# Patient Record
Sex: MALE | Race: WHITE | Employment: OTHER | URBAN - NONMETROPOLITAN AREA
[De-identification: names, ages, dates, MRNs, and addresses within clinical notes are randomized per-mention and may not be internally consistent; named-entity substitution may affect disease eponyms.]

---

## 2018-03-10 ENCOUNTER — HOSPITAL ENCOUNTER (EMERGENCY)
Facility: HOSPITAL | Age: 63
Discharge: ANOTHER HOSPITAL | End: 2018-03-10
Attending: FAMILY MEDICINE | Admitting: FAMILY MEDICINE
Payer: MEDICARE

## 2018-03-10 ENCOUNTER — APPOINTMENT (OUTPATIENT)
Dept: CT IMAGING | Facility: HOSPITAL | Age: 63
End: 2018-03-10
Attending: FAMILY MEDICINE
Payer: MEDICARE

## 2018-03-10 ENCOUNTER — TRANSFERRED RECORDS (OUTPATIENT)
Dept: HEALTH INFORMATION MANAGEMENT | Facility: CLINIC | Age: 63
End: 2018-03-10

## 2018-03-10 VITALS
WEIGHT: 205 LBS | SYSTOLIC BLOOD PRESSURE: 215 MMHG | HEART RATE: 85 BPM | RESPIRATION RATE: 18 BRPM | TEMPERATURE: 98.6 F | HEIGHT: 72 IN | OXYGEN SATURATION: 98 % | DIASTOLIC BLOOD PRESSURE: 164 MMHG | BODY MASS INDEX: 27.77 KG/M2

## 2018-03-10 DIAGNOSIS — R47.01 EXPRESSIVE APHASIA: ICD-10-CM

## 2018-03-10 DIAGNOSIS — I63.9 CEREBROVASCULAR ACCIDENT (CVA), UNSPECIFIED MECHANISM (H): ICD-10-CM

## 2018-03-10 LAB
ANION GAP SERPL CALCULATED.3IONS-SCNC: 13 MMOL/L (ref 3–14)
APTT PPP: 27 SEC (ref 24–37)
BASOPHILS # BLD AUTO: 0.1 10E9/L (ref 0–0.2)
BASOPHILS NFR BLD AUTO: 0.9 %
BUN SERPL-MCNC: 62 MG/DL (ref 7–30)
CALCIUM SERPL-MCNC: 8.7 MG/DL (ref 8.5–10.1)
CHLORIDE SERPL-SCNC: 106 MMOL/L (ref 94–109)
CO2 SERPL-SCNC: 16 MMOL/L (ref 20–32)
CREAT SERPL-MCNC: 6.89 MG/DL (ref 0.66–1.25)
DIFFERENTIAL METHOD BLD: ABNORMAL
EOSINOPHIL # BLD AUTO: 0.4 10E9/L (ref 0–0.7)
EOSINOPHIL NFR BLD AUTO: 3.9 %
ERYTHROCYTE [DISTWIDTH] IN BLOOD BY AUTOMATED COUNT: 13.2 % (ref 10–15)
GFR SERPL CREATININE-BSD FRML MDRD: 8 ML/MIN/1.7M2
GLUCOSE SERPL-MCNC: 103 MG/DL (ref 70–99)
HCT VFR BLD AUTO: 35.9 % (ref 40–53)
HGB BLD-MCNC: 12.2 G/DL (ref 13.3–17.7)
IMM GRANULOCYTES # BLD: 0.1 10E9/L (ref 0–0.4)
IMM GRANULOCYTES NFR BLD: 0.6 %
INR PPP: 0.89 (ref 0.8–1.2)
LYMPHOCYTES # BLD AUTO: 1.6 10E9/L (ref 0.8–5.3)
LYMPHOCYTES NFR BLD AUTO: 15.1 %
MCH RBC QN AUTO: 33.3 PG (ref 26.5–33)
MCHC RBC AUTO-ENTMCNC: 34 G/DL (ref 31.5–36.5)
MCV RBC AUTO: 98 FL (ref 78–100)
MONOCYTES # BLD AUTO: 0.4 10E9/L (ref 0–1.3)
MONOCYTES NFR BLD AUTO: 4.3 %
NEUTROPHILS # BLD AUTO: 7.8 10E9/L (ref 1.6–8.3)
NEUTROPHILS NFR BLD AUTO: 75.2 %
NRBC # BLD AUTO: 0 10*3/UL
NRBC BLD AUTO-RTO: 0 /100
PLATELET # BLD AUTO: 169 10E9/L (ref 150–450)
POTASSIUM SERPL-SCNC: 4.5 MMOL/L (ref 3.4–5.3)
RBC # BLD AUTO: 3.66 10E12/L (ref 4.4–5.9)
SODIUM SERPL-SCNC: 135 MMOL/L (ref 133–144)
TROPONIN I SERPL-MCNC: 0.62 UG/L (ref 0–0.04)
WBC # BLD AUTO: 10.3 10E9/L (ref 4–11)

## 2018-03-10 PROCEDURE — 25000128 H RX IP 250 OP 636: Performed by: RADIOLOGY

## 2018-03-10 PROCEDURE — A9270 NON-COVERED ITEM OR SERVICE: HCPCS | Mod: GY | Performed by: FAMILY MEDICINE

## 2018-03-10 PROCEDURE — 85025 COMPLETE CBC W/AUTO DIFF WBC: CPT | Performed by: FAMILY MEDICINE

## 2018-03-10 PROCEDURE — 70498 CT ANGIOGRAPHY NECK: CPT | Mod: TC

## 2018-03-10 PROCEDURE — 96375 TX/PRO/DX INJ NEW DRUG ADDON: CPT

## 2018-03-10 PROCEDURE — 25000128 H RX IP 250 OP 636

## 2018-03-10 PROCEDURE — 96365 THER/PROPH/DIAG IV INF INIT: CPT

## 2018-03-10 PROCEDURE — 85610 PROTHROMBIN TIME: CPT | Performed by: FAMILY MEDICINE

## 2018-03-10 PROCEDURE — 99291 CRITICAL CARE FIRST HOUR: CPT | Mod: 25

## 2018-03-10 PROCEDURE — 84484 ASSAY OF TROPONIN QUANT: CPT | Performed by: FAMILY MEDICINE

## 2018-03-10 PROCEDURE — 70450 CT HEAD/BRAIN W/O DYE: CPT | Mod: TC

## 2018-03-10 PROCEDURE — 80048 BASIC METABOLIC PNL TOTAL CA: CPT | Performed by: FAMILY MEDICINE

## 2018-03-10 PROCEDURE — 25000128 H RX IP 250 OP 636: Performed by: FAMILY MEDICINE

## 2018-03-10 PROCEDURE — 99285 EMERGENCY DEPT VISIT HI MDM: CPT | Performed by: FAMILY MEDICINE

## 2018-03-10 PROCEDURE — 25000132 ZZH RX MED GY IP 250 OP 250 PS 637: Mod: GY | Performed by: FAMILY MEDICINE

## 2018-03-10 PROCEDURE — 00000146 ZZHCL STATISTIC GLUCOSE BY METER IP

## 2018-03-10 PROCEDURE — 36415 COLL VENOUS BLD VENIPUNCTURE: CPT | Mod: 59 | Performed by: FAMILY MEDICINE

## 2018-03-10 PROCEDURE — 96376 TX/PRO/DX INJ SAME DRUG ADON: CPT

## 2018-03-10 PROCEDURE — 85730 THROMBOPLASTIN TIME PARTIAL: CPT | Performed by: FAMILY MEDICINE

## 2018-03-10 PROCEDURE — 93010 ELECTROCARDIOGRAM REPORT: CPT | Performed by: INTERNAL MEDICINE

## 2018-03-10 PROCEDURE — 93005 ELECTROCARDIOGRAM TRACING: CPT

## 2018-03-10 RX ORDER — CLONIDINE HYDROCHLORIDE 0.1 MG/1
0.1 TABLET ORAL ONCE
Status: COMPLETED | OUTPATIENT
Start: 2018-03-10 | End: 2018-03-10

## 2018-03-10 RX ORDER — ISOSORBIDE MONONITRATE 30 MG/1
30 TABLET, EXTENDED RELEASE ORAL ONCE
Status: COMPLETED | OUTPATIENT
Start: 2018-03-10 | End: 2018-03-10

## 2018-03-10 RX ORDER — CARVEDILOL 12.5 MG/1
12.5 TABLET ORAL ONCE
Status: COMPLETED | OUTPATIENT
Start: 2018-03-10 | End: 2018-03-10

## 2018-03-10 RX ORDER — LABETALOL HYDROCHLORIDE 5 MG/ML
10 INJECTION, SOLUTION INTRAVENOUS EVERY 10 MIN PRN
Status: DISCONTINUED | OUTPATIENT
Start: 2018-03-10 | End: 2018-03-10 | Stop reason: HOSPADM

## 2018-03-10 RX ORDER — IOPAMIDOL 755 MG/ML
75 INJECTION, SOLUTION INTRAVASCULAR ONCE
Status: COMPLETED | OUTPATIENT
Start: 2018-03-10 | End: 2018-03-10

## 2018-03-10 RX ADMIN — LABETALOL HYDROCHLORIDE 20 MG: 5 INJECTION, SOLUTION INTRAVENOUS at 12:39

## 2018-03-10 RX ADMIN — LABETALOL HYDROCHLORIDE 10 MG: 5 INJECTION, SOLUTION INTRAVENOUS at 12:26

## 2018-03-10 RX ADMIN — ALTEPLASE 75.33 MG: KIT at 12:08

## 2018-03-10 RX ADMIN — ISOSORBIDE MONONITRATE 30 MG: 30 TABLET, EXTENDED RELEASE ORAL at 12:52

## 2018-03-10 RX ADMIN — LABETALOL HYDROCHLORIDE 10 MG: 5 INJECTION, SOLUTION INTRAVENOUS at 12:18

## 2018-03-10 RX ADMIN — ALTEPLASE 8.37 MG: KIT at 12:03

## 2018-03-10 RX ADMIN — CLONIDINE HYDROCHLORIDE 0.1 MG: 0.1 TABLET ORAL at 12:44

## 2018-03-10 RX ADMIN — CARVEDILOL 12.5 MG: 12.5 TABLET, FILM COATED ORAL at 12:52

## 2018-03-10 RX ADMIN — IOPAMIDOL 75 ML: 755 INJECTION, SOLUTION INTRAVENOUS at 11:44

## 2018-03-10 RX ADMIN — SODIUM CHLORIDE 1000 ML: 9 INJECTION, SOLUTION INTRAVENOUS at 11:37

## 2018-03-10 RX ADMIN — LABETALOL HYDROCHLORIDE 10 MG: 5 INJECTION, SOLUTION INTRAVENOUS at 12:06

## 2018-03-10 NOTE — ED NOTES
Code stroked called overhead at 1103 after report from EMS. Per EMS pt was shoveling snow and felt like he might be having a stroke. EMS notes slight right sided facial droop, slurred speech and confusion. VORB per Dr. Mccauley to order head CT. Pt directly to CT with MD and primary RN.

## 2018-03-10 NOTE — ED PROVIDER NOTES
History     Chief Complaint   Patient presents with     Cerebrovascular Accident     right side facial droop, slurred speech, confusion, Code stroke called in field by EMS     HPI  Stuart Stewart is a 62 year old male who was outside shoveling snow and began to have problems with right-sided facial droop, garbled speech, confusion, and right hand weakness.  Apparently he also had some leg weakness initially because he was stumbling around.  He managed to get himself in the house and call 911.  Code stroke was called in the field by EMS.  Patient was taken directly to CT on arrival in the hospital.  On arrival here he had garbled speech with occasional yes and no answers were oriented, however he could not get out any other intelligible speech.  He has right hand weakness, was having difficulty writing things for us.  He got very frustrated and was unable to communicate as well as he wanted to initially, but eventually was he did communicate to us that he is concerned about draining his dialysate.  We do not have the equipment for this and that is deferred to [facility].  I spoke with his dialysis coordinator and she stated that it was fine to leave the fluid in, but it would eventually reabsorb, but the patient was not interested in that.  He also told us that he had not taken any of his pills yet this morning.    Problem List:    There are no active problems to display for this patient.     Past Medical History:    No past medical history on file.    Past Surgical History:    No past surgical history on file.    Family History:    No family history on file.    Social History:  Marital Status:   [4]  Social History   Substance Use Topics     Smoking status: Current Every Day Smoker     Packs/day: 2.00     Smokeless tobacco: Never Used     Alcohol use No        Medications:      Carvedilol (COREG PO)   CloNIDine HCl (CATAPRES PO)   Isosorbide Mononitrate (IMDUR PO)   FINASTERIDE PO   Lisinopril (PRINIVIL  PO)   senna-docusate (SENOKOT-S;PERICOLACE) 8.6-50 MG per tablet   Sertraline HCl (ZOLOFT PO)   Simvastatin (ZOCOR PO)   tamsulosin (FLOMAX) 0.4 MG 24 hr capsule   B Complex-C-Folic Acid (DIALYVITE PO)   MINOXIDIL PO   UNABLE TO FIND         Review of Systems   Unable to perform ROS: Patient nonverbal       Physical Exam   BP: (!) 203/136  Pulse: 73  Heart Rate: 74  Temp: 98.6  F (37  C)  Resp: 18  Height: 182.9 cm (6')  Weight: 93 kg (205 lb)  SpO2: 99 %      Physical Exam   Constitutional: He appears well-developed and well-nourished. He appears distressed.   HENT:   Head: Normocephalic and atraumatic.   Did not fall or hit head.   Neck: Normal range of motion. Neck supple.   Cardiovascular: Normal rate, regular rhythm, normal heart sounds and intact distal pulses.    No murmur heard.  Pulmonary/Chest: Effort normal and breath sounds normal. No respiratory distress.   Abdominal: Soft. Bowel sounds are normal. He exhibits no distension. There is no tenderness.   Dialysis catheter present on mid abdomen.   Musculoskeletal: Normal range of motion. He exhibits no edema.   Neurological: He is alert. No cranial nerve deficit. He exhibits normal muscle tone. Coordination abnormal.   Right hand weakness / incoordination.  Speech garbled, unintelligible at time of arrival.   Skin: Skin is warm and dry.   Psychiatric: His affect is labile.   Pseudobulbar affect.   Nursing note and vitals reviewed.      ED Course     ED Course     Procedures          EKG Interpretation:      Interpreted by Jeanine Flores  Time reviewed: 1140  Symptoms at time of EKG: stroke   Rhythm: 1 degree AV block  Rate: Normal  Axis: Normal  Ectopy: none  Conduction: normal  ST Segments/ T Waves: Non-specific T wave changes  Q Waves: I, II, III and shallow  Comparison to prior: No old EKG available    Clinical Impression: non-specific EKG      Results for orders placed or performed during the hospital encounter of 03/10/18 (from the past 24  hour(s))   Basic metabolic panel   Result Value Ref [place]    Sodium 135 133 - 144 mmol/L    Potassium 4.5 3.4 - 5.3 mmol/L    Chloride 106 94 - 109 mmol/L    Carbon Dioxide 16 (L) 20 - 32 mmol/L    Anion Gap 13 3 - 14 mmol/L    Glucose 103 (H) 70 - 99 mg/dL    Urea Nitrogen 62 (H) 7 - 30 mg/dL    Creatinine 6.89 (H) 0.66 - 1.25 mg/dL    GFR Estimate 8 (L) >60 mL/min/1.7m2    GFR Estimate If Black 10 (L) >60 mL/min/1.7m2    Calcium 8.7 8.5 - 10.1 mg/dL   CBC with platelets differential   Result Value Ref [place]    WBC 10.3 4.0 - 11.0 10e9/L    RBC Count 3.66 (L) 4.4 - 5.9 10e12/L    Hemoglobin 12.2 (L) 13.3 - 17.7 g/dL    Hematocrit 35.9 (L) 40.0 - 53.0 %    MCV 98 78 - 100 fl    MCH 33.3 (H) 26.5 - 33.0 pg    MCHC 34.0 31.5 - 36.5 g/dL    RDW 13.2 10.0 - 15.0 %    Platelet Count 169 150 - 450 10e9/L    Diff Method Automated Method     % Neutrophils 75.2 %    % Lymphocytes 15.1 %    % Monocytes 4.3 %    % Eosinophils 3.9 %    % Basophils 0.9 %    % Immature Granulocytes 0.6 %    Nucleated RBCs 0 0 /100    Absolute Neutrophil 7.8 1.6 - 8.3 10e9/L    Absolute Lymphocytes 1.6 0.8 - 5.3 10e9/L    Absolute Monocytes 0.4 0.0 - 1.3 10e9/L    Absolute Eosinophils 0.4 0.0 - 0.7 10e9/L    Absolute Basophils 0.1 0.0 - 0.2 10e9/L    Abs Immature Granulocytes 0.1 0 - 0.4 10e9/L    Absolute Nucleated RBC 0.0    INR   Result Value Ref [place]    INR 0.89 0.80 - 1.20   Partial thromboplastin time   Result Value Ref [place]    PTT 27 24.00 - 37.00 sec   Troponin I   Result Value Ref [place]    Troponin I ES 0.619 (HH) 0.000 - 0.045 ug/L   Head CT w/o contrast    Narrative    PROCEDURE: CT HEAD W/O CONTRAST   3/10/2018 11:38 AM    HISTORY:Male, age,  62 years, , , code stroke;     COMPARISON: None    TECHNIQUE: CT of the brain without contrast.    FINDINGS: Ventricles and sulci are mildly prominent in size and shape.  Gray and white matter demonstrate scattered areas of decreased  density. Encephalomalacia suggesting previous watershed  infarct in the  right parietal region prior infarctions involving portions of the left  and right posterior cerebral artery distributions.    There is no evidence of mass, mass effect or midline shift. No  evidence of acute hemorrhage.    The bones are essentially normal. No fracture.       Impression    IMPRESSION:   1.  No acute intracranial hemorrhage. No acute fracture.     2.  White matter changes suggesting small vessel disease with old  right parietal watershed infarct and old infarcts involving a portion  of the left and right posterior cerebral artery distributions.    ASHLEY MCKENNA MD   CTA Angiogram Head Neck    Narrative    PROCEDURE: CTA ANGIOGRAM HEAD NECK   3/10/2018 11:47 AM    HISTORY:Male, age,  62 years, , , stroke sx;     COMPARISON: Head CT 3/10/2018    TECHNIQUE: CT scan was performed of the head and neck  before and  after the administration of intravenous contrast. Sagittal, coronal,  axial and 3-D reconstructed images were reviewed.    FINDINGS:     Neck Angiogram:  Scattered atherosclerotic calcifications throughout  the aortic arch. Tortuous, ectatic origin of both the left and right  subclavian arteries, with prominent atherosclerotic plaque.  Moderate volume of calcified plaque is seen within the carotid  bifurcations.    Head angiogram: The vertebral basilar system is patent..  The left and right internal carotid arteries demonstrate mild  atherosclerotic disease in the cavernous portions. The anterior and  middle cerebral arteries and respective branches are also grossly  normal.    CT scan neck: Salivary glands are normal. Oral and pharyngeal mucosa  is also normal. Larynx is normal. No lymphadenopathy. Paraspinous  muscles are normal. Severe facet joint degenerative changes are seen  at C3-4 with resulting grade 1-2 subluxation of C3 relative to C4.  Uncinate spurring contributes to moderately severe bilateral foraminal  stenosis at C4-5, C5-6 and C6-7. There are prominent  hypertrophic  changes seen about the left facet joint at C4-5.    Head CT: There is no evidence of mass, mass effect or midline shift.  No evidence of acute hemorrhage.    Mandible is intact. Paranasal sinuses are normal. No evidence acute  fracture.       Impression    IMPRESSION:   1.  No evidence of acute abnormality.    2.  Mild stenosis involving the origin of the right ICA secondary to  calcified noncalcified plaque.    3.  Mixed paraseptal and centrilobular emphysema within the lung  apices.    4.  Prominent degenerative changes throughout the cervical spine with  facet related subluxation of C3 relative to C4, bilateral foraminal  stenosis at C3-4 through C6-7.     5.  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    ASHLEY LEÓN MD          CTA Angiogram Head Neck (Final result) Result time: 03/10/18 12:20:33     Final result by Ashley León MD (03/10/18 12:20:33)     Impression:     IMPRESSION:   1.  No evidence of acute abnormality.    2.  Mild stenosis involving the origin of the right ICA secondary to  calcified noncalcified plaque.    3.  Mixed paraseptal and centrilobular emphysema within the lung  apices.    4.  Prominent degenerative changes throughout the cervical spine with  facet related subluxation of C3 relative to C4, bilateral foraminal  stenosis at C3-4 through C6-7.     5.  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    ASHLEY LEÓN MD     Narrative:     PROCEDURE: CTA ANGIOGRAM HEAD NECK   3/10/2018 11:47 AM    HISTORY:Male, age,  62 years, , , stroke sx;     COMPARISON: Head CT 3/10/2018    TECHNIQUE: CT scan was performed of the head and neck  before and  after the administration of intravenous contrast. Sagittal, coronal,  axial and 3-D reconstructed images were reviewed.    FINDINGS:     Neck Angiogram:  Scattered atherosclerotic calcifications throughout  the aortic arch. Tortuous, ectatic origin of both the  left and right  subclavian arteries, with prominent atherosclerotic plaque.  Moderate volume of calcified plaque is seen within the carotid  bifurcations.    Head angiogram: The vertebral basilar system is patent..  The left and right internal carotid arteries demonstrate mild  atherosclerotic disease in the cavernous portions. The anterior and  middle cerebral arteries and respective branches are also grossly  normal.    CT scan neck: Salivary glands are normal. Oral and pharyngeal mucosa  is also normal. Larynx is normal. No lymphadenopathy. Paraspinous  muscles are normal. Severe facet joint degenerative changes are seen  at C3-4 with resulting grade 1-2 subluxation of C3 relative to C4.  Uncinate spurring contributes to moderately severe bilateral foraminal  stenosis at C4-5, C5-6 and C6-7. There are prominent hypertrophic  changes seen about the left facet joint at C4-5.    Head CT: There is no evidence of mass, mass effect or midline shift.  No evidence of acute hemorrhage.    Mandible is intact. Paranasal sinuses are normal. No evidence acute  fracture.        Assessments & Plan (with Medical Decision Making)   Patient is having stroke.  CT angiogram notes fusiform ectasia including the proximal aspect of the basilar artery and also mild stenosis of the origin of the right ICA secondary to calcified plaque.  No evidence of hemorrhage.  Patient meets criteria for TPA the exception of his blood pressure which is decreasing medication.  TPA administered and patient's speech improved significantly.  Still has some weakness in his right hand, is not able to hold a pen to write.  See HPI regarding the concerns with his peritoneal dialysis catheter.  Patient is being transported to [facility] via helicopter for consideration of thrombectomy.    I have reviewed the nursing notes.    I have reviewed the findings, diagnosis, plan and need for follow up with the patient.  New Prescriptions    No medications on file        Final diagnoses:   Cerebrovascular accident (CVA), unspecified mechanism (H)   Expressive aphasia       3/10/2018   [facility]     Jeanine Bishop MD  03/10/18 9242

## 2018-03-10 NOTE — DISCHARGE INSTRUCTIONS
What to expect when you have contrast    During your exam, we will inject  contrast  into your vein or artery. (Contrast is a clear liquid with iodine in it. It shows up on X-rays.)    You may feel warm or hot. You may have a metal taste in your mouth and a slight upset stomach. You may also feel pressure near the kidneys and bladder. These effects will last about 1 to 3 minutes.    Please tell us if you have:    Sneezing     Itching    Hives     Swelling in the face    A hoarse voice    Breathing problems    Other new symptoms    Serious problems are rare.  They may include:    Irregular heartbeat     Seizures    Kidney failure              Tissue damage    Shock      Death    If you have any problems during the exam, we  will treat them right away.    When you get home    Call your hospital if you have any new symptoms in the next 2 days, like hives or swelling. (Phone numbers are at the bottom of this page.) Or call your family doctor.     If you have wheezing or trouble breathing, call 911.    Self-care  -Drink at least 4 extra glasses of water today.   This reduces the stress on your kidneys.  -Keep taking your regular medicines.    The contrast will pass out of your body in your  Urine(pee). This will happen in the next 24 hours. You  will not feel this. Your urine will not  change color.    If you have kidney problems or take metformin    Drink 4 to 8 large glasses of water for the next  2 days, if you are not on a fluid restriction.    ?If you take metformin (Glucophage or Glucovance) for diabetes, keep taking it.      ?Your kidney function tests are abnormal.  If you take Metformin, do not take it for 48 hours. Please go to your clinic for a blood test within 3 days after your exam before the restarting this medicine.     (Note to provider:please give patient prescription for lab tests.)    ?Special instructions: Drink 4 to 8 large glasses of water for the next  2 days, if you are not on a fluid  restriction.      I have read and understand the above information.    Patient Sign Here:______________________________________Date:________Time:______    Staff Sign Here:________________________________________Date:_______Time:______      Radiology Departments:     ?[facility]: [phone] ?[place]: [phone]     ?[place]: [phone] ?[facility]:[phone]      ?[place]: [phone]  ?[place]: [phone]  ?[place]:[phone]    ?[facility]:[phone]  ?[facility]:[phone]

## 2018-03-10 NOTE — ED NOTES
Critical result of  troponin value of 0.619  Reported to  Dr. Tariq Flores  Time given to provider  1230  Dacia Sandhu RN 12:30 PM 3/10/2018

## 2018-03-10 NOTE — ED NOTES
Patient being transported to [facility] with [employer]. TPA and fluids infusing on transfer. tPA administration copied and sent with flight crew. Belongings sent with patient. Dr. Mike Flores contacted patient's brother Joe and left voicemail to call this facility. Elvia with flight crew also called patient's brother Jarrell to update. Improvement in patient's speech noted on patient's departure. See documentation sheet for vital signs.

## 2018-03-13 LAB — GLUCOSE BLDC GLUCOMTR-MCNC: 105 MG/DL (ref 70–99)

## 2018-08-07 ENCOUNTER — HOSPITAL ENCOUNTER (OUTPATIENT)
Dept: CT IMAGING | Facility: HOSPITAL | Age: 63
Discharge: HOME | End: 2018-08-07
Attending: FAMILY MEDICINE | Admitting: FAMILY MEDICINE
Payer: MEDICARE

## 2018-08-07 DIAGNOSIS — R11.0 NAUSEA: ICD-10-CM

## 2018-08-07 DIAGNOSIS — R63.4 WEIGHT LOSS: ICD-10-CM

## 2018-08-07 DIAGNOSIS — R10.9 ABDOMINAL PAIN: ICD-10-CM

## 2018-08-07 PROCEDURE — 74177 CT ABD & PELVIS W/CONTRAST: CPT | Mod: TC

## 2018-08-07 PROCEDURE — 25000128 H RX IP 250 OP 636: Performed by: RADIOLOGY

## 2018-08-07 RX ORDER — IOPAMIDOL 612 MG/ML
100 INJECTION, SOLUTION INTRAVASCULAR ONCE
Status: COMPLETED | OUTPATIENT
Start: 2018-08-07 | End: 2018-08-07

## 2018-08-07 RX ADMIN — DIATRIZOATE MEGLUMINE AND DIATRIZOATE SODIUM 30 ML: 660; 100 SOLUTION ORAL; RECTAL at 08:22

## 2018-08-07 RX ADMIN — IOPAMIDOL 100 ML: 612 INJECTION, SOLUTION INTRAVENOUS at 08:23

## 2020-01-01 ENCOUNTER — APPOINTMENT (OUTPATIENT)
Dept: PHYSICAL THERAPY | Facility: HOSPITAL | Age: 65
End: 2020-01-01
Attending: INTERNAL MEDICINE
Payer: MEDICARE

## 2020-01-01 ENCOUNTER — APPOINTMENT (OUTPATIENT)
Dept: CT IMAGING | Facility: HOSPITAL | Age: 65
End: 2020-01-01
Attending: EMERGENCY MEDICINE
Payer: MEDICARE

## 2020-01-01 ENCOUNTER — APPOINTMENT (OUTPATIENT)
Dept: GENERAL RADIOLOGY | Facility: HOSPITAL | Age: 65
End: 2020-01-01
Attending: FAMILY MEDICINE
Payer: MEDICARE

## 2020-01-01 ENCOUNTER — APPOINTMENT (OUTPATIENT)
Dept: OCCUPATIONAL THERAPY | Facility: HOSPITAL | Age: 65
End: 2020-01-01
Payer: MEDICARE

## 2020-01-01 ENCOUNTER — APPOINTMENT (OUTPATIENT)
Dept: PHYSICAL THERAPY | Facility: HOSPITAL | Age: 65
End: 2020-01-01
Payer: MEDICARE

## 2020-01-01 ENCOUNTER — HOSPITAL ENCOUNTER (EMERGENCY)
Facility: HOSPITAL | Age: 65
Discharge: HOME | End: 2020-01-23
Attending: EMERGENCY MEDICINE | Admitting: EMERGENCY MEDICINE
Payer: MEDICARE

## 2020-01-01 ENCOUNTER — APPOINTMENT (OUTPATIENT)
Dept: OCCUPATIONAL THERAPY | Facility: HOSPITAL | Age: 65
End: 2020-01-01
Attending: INTERNAL MEDICINE
Payer: MEDICARE

## 2020-01-01 ENCOUNTER — HOSPITAL ENCOUNTER (OUTPATIENT)
Facility: HOSPITAL | Age: 65
Setting detail: OBSERVATION
Discharge: HOME | End: 2020-01-30
Attending: FAMILY MEDICINE | Admitting: INTERNAL MEDICINE
Payer: MEDICARE

## 2020-01-01 VITALS
DIASTOLIC BLOOD PRESSURE: 93 MMHG | HEIGHT: 73 IN | BODY MASS INDEX: 22.56 KG/M2 | HEART RATE: 79 BPM | SYSTOLIC BLOOD PRESSURE: 121 MMHG | RESPIRATION RATE: 16 BRPM | WEIGHT: 170.19 LBS | TEMPERATURE: 96.3 F | OXYGEN SATURATION: 96 %

## 2020-01-01 VITALS — DIASTOLIC BLOOD PRESSURE: 98 MMHG | SYSTOLIC BLOOD PRESSURE: 150 MMHG | TEMPERATURE: 97.4 F | RESPIRATION RATE: 16 BRPM

## 2020-01-01 DIAGNOSIS — R62.51 FAILURE TO THRIVE (0-17): ICD-10-CM

## 2020-01-01 DIAGNOSIS — E87.5 HYPERKALEMIA: ICD-10-CM

## 2020-01-01 DIAGNOSIS — M62.81 GENERALIZED MUSCLE WEAKNESS: Primary | ICD-10-CM

## 2020-01-01 DIAGNOSIS — N18.5 CKD (CHRONIC KIDNEY DISEASE) STAGE 5, GFR LESS THAN 15 ML/MIN (H): ICD-10-CM

## 2020-01-01 LAB
ALBUMIN SERPL-MCNC: 3.2 G/DL (ref 3.4–5)
ALBUMIN SERPL-MCNC: 3.2 G/DL (ref 3.4–5)
ALBUMIN UR-MCNC: 30 MG/DL
ALP SERPL-CCNC: 104 U/L (ref 40–150)
ALP SERPL-CCNC: 104 U/L (ref 40–150)
ALT SERPL W P-5'-P-CCNC: 29 U/L (ref 0–70)
ALT SERPL W P-5'-P-CCNC: 29 U/L (ref 0–70)
ANION GAP SERPL CALCULATED.3IONS-SCNC: 12 MMOL/L (ref 3–14)
ANION GAP SERPL CALCULATED.3IONS-SCNC: 13 MMOL/L (ref 3–14)
APPEARANCE UR: CLEAR
AST SERPL W P-5'-P-CCNC: 11 U/L (ref 0–45)
AST SERPL W P-5'-P-CCNC: 8 U/L (ref 0–45)
BACTERIA #/AREA URNS HPF: ABNORMAL /HPF
BACTERIA SPEC CULT: NORMAL
BASOPHILS # BLD AUTO: 0.1 10E9/L (ref 0–0.2)
BASOPHILS NFR BLD AUTO: 0.7 %
BASOPHILS NFR BLD AUTO: 0.8 %
BASOPHILS NFR BLD AUTO: 0.9 %
BASOPHILS NFR BLD AUTO: 1 %
BILIRUB SERPL-MCNC: 0.4 MG/DL (ref 0.2–1.3)
BILIRUB SERPL-MCNC: 0.4 MG/DL (ref 0.2–1.3)
BILIRUB UR QL STRIP: NEGATIVE
BUN SERPL-MCNC: 100 MG/DL (ref 7–30)
BUN SERPL-MCNC: 90 MG/DL (ref 7–30)
BUN SERPL-MCNC: 93 MG/DL (ref 7–30)
BUN SERPL-MCNC: 95 MG/DL (ref 7–30)
BUN SERPL-MCNC: 99 MG/DL (ref 7–30)
CALCIUM SERPL-MCNC: 8.2 MG/DL (ref 8.5–10.1)
CALCIUM SERPL-MCNC: 8.5 MG/DL (ref 8.5–10.1)
CALCIUM SERPL-MCNC: 8.5 MG/DL (ref 8.5–10.1)
CALCIUM SERPL-MCNC: 8.6 MG/DL (ref 8.5–10.1)
CALCIUM SERPL-MCNC: 8.6 MG/DL (ref 8.5–10.1)
CHLORIDE SERPL-SCNC: 101 MMOL/L (ref 94–109)
CHLORIDE SERPL-SCNC: 101 MMOL/L (ref 94–109)
CHLORIDE SERPL-SCNC: 103 MMOL/L (ref 94–109)
CO2 SERPL-SCNC: 18 MMOL/L (ref 20–32)
CO2 SERPL-SCNC: 18 MMOL/L (ref 20–32)
CO2 SERPL-SCNC: 21 MMOL/L (ref 20–32)
CO2 SERPL-SCNC: 22 MMOL/L (ref 20–32)
CO2 SERPL-SCNC: 22 MMOL/L (ref 20–32)
COLOR UR AUTO: ABNORMAL
CREAT SERPL-MCNC: 10 MG/DL (ref 0.66–1.25)
CREAT SERPL-MCNC: 10.1 MG/DL (ref 0.66–1.25)
CREAT SERPL-MCNC: 10.2 MG/DL (ref 0.66–1.25)
CREAT SERPL-MCNC: 10.5 MG/DL (ref 0.66–1.25)
CREAT SERPL-MCNC: 10.6 MG/DL (ref 0.66–1.25)
CRP SERPL-MCNC: <2.9 MG/L (ref 0–8)
DIFFERENTIAL METHOD BLD: ABNORMAL
EOSINOPHIL # BLD AUTO: 0.2 10E9/L (ref 0–0.7)
EOSINOPHIL # BLD AUTO: 0.3 10E9/L (ref 0–0.7)
EOSINOPHIL NFR BLD AUTO: 3.3 %
EOSINOPHIL NFR BLD AUTO: 3.6 %
EOSINOPHIL NFR BLD AUTO: 3.9 %
EOSINOPHIL NFR BLD AUTO: 4 %
ERYTHROCYTE [DISTWIDTH] IN BLOOD BY AUTOMATED COUNT: 14.2 % (ref 10–15)
ERYTHROCYTE [DISTWIDTH] IN BLOOD BY AUTOMATED COUNT: 14.3 % (ref 10–15)
ERYTHROCYTE [DISTWIDTH] IN BLOOD BY AUTOMATED COUNT: 14.4 % (ref 10–15)
ERYTHROCYTE [DISTWIDTH] IN BLOOD BY AUTOMATED COUNT: 14.6 % (ref 10–15)
ERYTHROCYTE [SEDIMENTATION RATE] IN BLOOD BY WESTERGREN METHOD: 21 MM/H (ref 0–20)
GFR SERPL CREATININE-BSD FRML MDRD: 5 ML/MIN/{1.73_M2}
GLUCOSE BLDC GLUCOMTR-MCNC: 100 MG/DL (ref 70–99)
GLUCOSE SERPL-MCNC: 108 MG/DL (ref 70–99)
GLUCOSE SERPL-MCNC: 115 MG/DL (ref 70–99)
GLUCOSE SERPL-MCNC: 81 MG/DL (ref 70–99)
GLUCOSE SERPL-MCNC: 85 MG/DL (ref 70–99)
GLUCOSE SERPL-MCNC: 90 MG/DL (ref 70–99)
GLUCOSE UR STRIP-MCNC: 70 MG/DL
HCT VFR BLD AUTO: 25.5 % (ref 40–53)
HCT VFR BLD AUTO: 27.3 % (ref 40–53)
HCT VFR BLD AUTO: 29.5 % (ref 40–53)
HCT VFR BLD AUTO: 30.2 % (ref 40–53)
HGB BLD-MCNC: 8.4 G/DL (ref 13.3–17.7)
HGB BLD-MCNC: 8.9 G/DL (ref 13.3–17.7)
HGB BLD-MCNC: 9.6 G/DL (ref 13.3–17.7)
HGB BLD-MCNC: 9.7 G/DL (ref 13.3–17.7)
HGB UR QL STRIP: ABNORMAL
IMM GRANULOCYTES # BLD: 0 10E9/L (ref 0–0.4)
IMM GRANULOCYTES NFR BLD: 0.3 %
IMM GRANULOCYTES NFR BLD: 0.4 %
IMM GRANULOCYTES NFR BLD: 0.5 %
IMM GRANULOCYTES NFR BLD: 0.5 %
KETONES UR STRIP-MCNC: NEGATIVE MG/DL
LACTATE BLD-SCNC: 0.4 MMOL/L (ref 0.7–2)
LACTATE BLD-SCNC: 0.4 MMOL/L (ref 0.7–2)
LEUKOCYTE ESTERASE UR QL STRIP: NEGATIVE
LYMPHOCYTES # BLD AUTO: 1 10E9/L (ref 0.8–5.3)
LYMPHOCYTES # BLD AUTO: 1 10E9/L (ref 0.8–5.3)
LYMPHOCYTES # BLD AUTO: 1.1 10E9/L (ref 0.8–5.3)
LYMPHOCYTES # BLD AUTO: 1.5 10E9/L (ref 0.8–5.3)
LYMPHOCYTES NFR BLD AUTO: 13.9 %
LYMPHOCYTES NFR BLD AUTO: 14.2 %
LYMPHOCYTES NFR BLD AUTO: 16.1 %
LYMPHOCYTES NFR BLD AUTO: 17.8 %
MCH RBC QN AUTO: 32.3 PG (ref 26.5–33)
MCH RBC QN AUTO: 32.4 PG (ref 26.5–33)
MCH RBC QN AUTO: 32.6 PG (ref 26.5–33)
MCH RBC QN AUTO: 33.1 PG (ref 26.5–33)
MCHC RBC AUTO-ENTMCNC: 32.1 G/DL (ref 31.5–36.5)
MCHC RBC AUTO-ENTMCNC: 32.5 G/DL (ref 31.5–36.5)
MCHC RBC AUTO-ENTMCNC: 32.6 G/DL (ref 31.5–36.5)
MCHC RBC AUTO-ENTMCNC: 32.9 G/DL (ref 31.5–36.5)
MCV RBC AUTO: 100 FL (ref 78–100)
MCV RBC AUTO: 100 FL (ref 78–100)
MCV RBC AUTO: 101 FL (ref 78–100)
MCV RBC AUTO: 99 FL (ref 78–100)
MONOCYTES # BLD AUTO: 0.4 10E9/L (ref 0–1.3)
MONOCYTES # BLD AUTO: 0.6 10E9/L (ref 0–1.3)
MONOCYTES NFR BLD AUTO: 5 %
MONOCYTES NFR BLD AUTO: 5.4 %
MONOCYTES NFR BLD AUTO: 6.5 %
MONOCYTES NFR BLD AUTO: 6.8 %
NEUTROPHILS # BLD AUTO: 4.6 10E9/L (ref 1.6–8.3)
NEUTROPHILS # BLD AUTO: 5.3 10E9/L (ref 1.6–8.3)
NEUTROPHILS # BLD AUTO: 5.8 10E9/L (ref 1.6–8.3)
NEUTROPHILS # BLD AUTO: 6 10E9/L (ref 1.6–8.3)
NEUTROPHILS NFR BLD AUTO: 70.2 %
NEUTROPHILS NFR BLD AUTO: 72.7 %
NEUTROPHILS NFR BLD AUTO: 75.6 %
NEUTROPHILS NFR BLD AUTO: 75.9 %
NITRATE UR QL: NEGATIVE
NRBC # BLD AUTO: 0 10*3/UL
NRBC BLD AUTO-RTO: 0 /100
PH UR STRIP: 6.5 PH (ref 4.7–8)
PHOSPHATE SERPL-MCNC: 8.1 MG/DL (ref 2.5–4.5)
PLATELET # BLD AUTO: 163 10E9/L (ref 150–450)
PLATELET # BLD AUTO: 180 10E9/L (ref 150–450)
PLATELET # BLD AUTO: 190 10E9/L (ref 150–450)
PLATELET # BLD AUTO: 201 10E9/L (ref 150–450)
POTASSIUM SERPL-SCNC: 4.8 MMOL/L (ref 3.4–5.3)
POTASSIUM SERPL-SCNC: 4.9 MMOL/L (ref 3.4–5.3)
POTASSIUM SERPL-SCNC: 5.5 MMOL/L (ref 3.4–5.3)
POTASSIUM SERPL-SCNC: 6 MMOL/L (ref 3.4–5.3)
POTASSIUM SERPL-SCNC: 6.3 MMOL/L (ref 3.4–5.3)
POTASSIUM SERPL-SCNC: 6.3 MMOL/L (ref 3.4–5.3)
PROT SERPL-MCNC: 6.3 G/DL (ref 6.8–8.8)
PROT SERPL-MCNC: 6.6 G/DL (ref 6.8–8.8)
RBC # BLD AUTO: 2.54 10E12/L (ref 4.4–5.9)
RBC # BLD AUTO: 2.73 10E12/L (ref 4.4–5.9)
RBC # BLD AUTO: 2.97 10E12/L (ref 4.4–5.9)
RBC # BLD AUTO: 2.99 10E12/L (ref 4.4–5.9)
RBC #/AREA URNS AUTO: <1 /HPF (ref 0–2)
SODIUM SERPL-SCNC: 133 MMOL/L (ref 133–144)
SODIUM SERPL-SCNC: 134 MMOL/L (ref 133–144)
SODIUM SERPL-SCNC: 134 MMOL/L (ref 133–144)
SODIUM SERPL-SCNC: 135 MMOL/L (ref 133–144)
SODIUM SERPL-SCNC: 137 MMOL/L (ref 133–144)
SOURCE: ABNORMAL
SP GR UR STRIP: 1.01 (ref 1–1.03)
SPECIMEN SOURCE: NORMAL
TROPONIN I SERPL-MCNC: <0.015 UG/L (ref 0–0.04)
TROPONIN I SERPL-MCNC: <0.015 UG/L (ref 0–0.04)
TSH SERPL DL<=0.005 MIU/L-ACNC: 1.14 MU/L (ref 0.4–4)
UROBILINOGEN UR STRIP-MCNC: NORMAL MG/DL (ref 0–2)
WBC # BLD AUTO: 6.3 10E9/L (ref 4–11)
WBC # BLD AUTO: 7 10E9/L (ref 4–11)
WBC # BLD AUTO: 7.9 10E9/L (ref 4–11)
WBC # BLD AUTO: 8.2 10E9/L (ref 4–11)
WBC #/AREA URNS AUTO: <1 /HPF (ref 0–5)

## 2020-01-01 PROCEDURE — G0378 HOSPITAL OBSERVATION PER HR: HCPCS

## 2020-01-01 PROCEDURE — 85025 COMPLETE CBC W/AUTO DIFF WBC: CPT | Performed by: EMERGENCY MEDICINE

## 2020-01-01 PROCEDURE — 97162 PT EVAL MOD COMPLEX 30 MIN: CPT | Mod: GP | Performed by: PHYSICAL THERAPIST

## 2020-01-01 PROCEDURE — 99225 ZZC SUBSEQUENT OBSERVATION CARE,LEVEL II: CPT | Performed by: HOSPITALIST

## 2020-01-01 PROCEDURE — 25000132 ZZH RX MED GY IP 250 OP 250 PS 637: Mod: GY | Performed by: HOSPITALIST

## 2020-01-01 PROCEDURE — 80053 COMPREHEN METABOLIC PANEL: CPT | Performed by: EMERGENCY MEDICINE

## 2020-01-01 PROCEDURE — 93005 ELECTROCARDIOGRAM TRACING: CPT

## 2020-01-01 PROCEDURE — 85025 COMPLETE CBC W/AUTO DIFF WBC: CPT | Performed by: NURSE PRACTITIONER

## 2020-01-01 PROCEDURE — 93010 ELECTROCARDIOGRAM REPORT: CPT | Performed by: INTERNAL MEDICINE

## 2020-01-01 PROCEDURE — 80053 COMPREHEN METABOLIC PANEL: CPT | Performed by: NURSE PRACTITIONER

## 2020-01-01 PROCEDURE — 40000786 ZZHCL STATISTIC ACTIVE MRSA SURVEILLANCE CULTURE: Performed by: INTERNAL MEDICINE

## 2020-01-01 PROCEDURE — 80048 BASIC METABOLIC PNL TOTAL CA: CPT | Performed by: INTERNAL MEDICINE

## 2020-01-01 PROCEDURE — 84100 ASSAY OF PHOSPHORUS: CPT | Performed by: HOSPITALIST

## 2020-01-01 PROCEDURE — 71045 X-RAY EXAM CHEST 1 VIEW: CPT | Mod: TC

## 2020-01-01 PROCEDURE — 99219 ZZC INITIAL OBSERVATION CARE,LEVL II: CPT | Performed by: INTERNAL MEDICINE

## 2020-01-01 PROCEDURE — 86140 C-REACTIVE PROTEIN: CPT | Performed by: EMERGENCY MEDICINE

## 2020-01-01 PROCEDURE — 00000146 ZZHCL STATISTIC GLUCOSE BY METER IP

## 2020-01-01 PROCEDURE — 25800030 ZZH RX IP 258 OP 636: Performed by: EMERGENCY MEDICINE

## 2020-01-01 PROCEDURE — 99285 EMERGENCY DEPT VISIT HI MDM: CPT | Mod: 25

## 2020-01-01 PROCEDURE — 25000128 H RX IP 250 OP 636: Performed by: HOSPITALIST

## 2020-01-01 PROCEDURE — 36415 COLL VENOUS BLD VENIPUNCTURE: CPT | Performed by: EMERGENCY MEDICINE

## 2020-01-01 PROCEDURE — 85025 COMPLETE CBC W/AUTO DIFF WBC: CPT | Mod: AY | Performed by: HOSPITALIST

## 2020-01-01 PROCEDURE — 25000132 ZZH RX MED GY IP 250 OP 250 PS 637: Mod: GY | Performed by: INTERNAL MEDICINE

## 2020-01-01 PROCEDURE — 83605 ASSAY OF LACTIC ACID: CPT | Performed by: NURSE PRACTITIONER

## 2020-01-01 PROCEDURE — 84484 ASSAY OF TROPONIN QUANT: CPT | Performed by: NURSE PRACTITIONER

## 2020-01-01 PROCEDURE — 25000128 H RX IP 250 OP 636: Performed by: EMERGENCY MEDICINE

## 2020-01-01 PROCEDURE — 97530 THERAPEUTIC ACTIVITIES: CPT | Mod: GP | Performed by: PHYSICAL THERAPIST

## 2020-01-01 PROCEDURE — 25000131 ZZH RX MED GY IP 250 OP 636 PS 637: Mod: GY | Performed by: EMERGENCY MEDICINE

## 2020-01-01 PROCEDURE — 36415 COLL VENOUS BLD VENIPUNCTURE: CPT | Performed by: NURSE PRACTITIONER

## 2020-01-01 PROCEDURE — 96365 THER/PROPH/DIAG IV INF INIT: CPT

## 2020-01-01 PROCEDURE — 25000132 ZZH RX MED GY IP 250 OP 250 PS 637: Mod: GY | Performed by: FAMILY MEDICINE

## 2020-01-01 PROCEDURE — 84443 ASSAY THYROID STIM HORMONE: CPT | Performed by: EMERGENCY MEDICINE

## 2020-01-01 PROCEDURE — 36415 COLL VENOUS BLD VENIPUNCTURE: CPT | Performed by: HOSPITALIST

## 2020-01-01 PROCEDURE — 83605 ASSAY OF LACTIC ACID: CPT | Performed by: EMERGENCY MEDICINE

## 2020-01-01 PROCEDURE — 84484 ASSAY OF TROPONIN QUANT: CPT | Performed by: EMERGENCY MEDICINE

## 2020-01-01 PROCEDURE — 96375 TX/PRO/DX INJ NEW DRUG ADDON: CPT

## 2020-01-01 PROCEDURE — 99217 ZZC OBSERVATION CARE DISCHARGE: CPT | Performed by: HOSPITALIST

## 2020-01-01 PROCEDURE — 99285 EMERGENCY DEPT VISIT HI MDM: CPT | Mod: Z6 | Performed by: FAMILY MEDICINE

## 2020-01-01 PROCEDURE — 97530 THERAPEUTIC ACTIVITIES: CPT | Mod: GO

## 2020-01-01 PROCEDURE — 85652 RBC SED RATE AUTOMATED: CPT | Performed by: EMERGENCY MEDICINE

## 2020-01-01 PROCEDURE — 84132 ASSAY OF SERUM POTASSIUM: CPT | Mod: 91 | Performed by: EMERGENCY MEDICINE

## 2020-01-01 PROCEDURE — 80048 BASIC METABOLIC PNL TOTAL CA: CPT | Performed by: HOSPITALIST

## 2020-01-01 PROCEDURE — 70450 CT HEAD/BRAIN W/O DYE: CPT | Mod: TC

## 2020-01-01 PROCEDURE — 81001 URINALYSIS AUTO W/SCOPE: CPT | Performed by: FAMILY MEDICINE

## 2020-01-01 PROCEDURE — 80048 BASIC METABOLIC PNL TOTAL CA: CPT | Mod: AY | Performed by: HOSPITALIST

## 2020-01-01 PROCEDURE — 97165 OT EVAL LOW COMPLEX 30 MIN: CPT | Mod: GO

## 2020-01-01 PROCEDURE — 25800025 ZZH RX 258: Performed by: EMERGENCY MEDICINE

## 2020-01-01 PROCEDURE — 99285 EMERGENCY DEPT VISIT HI MDM: CPT | Mod: Z6 | Performed by: EMERGENCY MEDICINE

## 2020-01-01 PROCEDURE — 36415 COLL VENOUS BLD VENIPUNCTURE: CPT | Performed by: INTERNAL MEDICINE

## 2020-01-01 RX ORDER — CINACALCET 30 MG/1
30 TABLET, FILM COATED ORAL DAILY
Status: DISCONTINUED | OUTPATIENT
Start: 2020-01-01 | End: 2020-01-01 | Stop reason: CLARIF

## 2020-01-01 RX ORDER — LIDOCAINE 4 G/G
2 PATCH TOPICAL
Status: DISCONTINUED | OUTPATIENT
Start: 2020-01-01 | End: 2020-01-01 | Stop reason: HOSPADM

## 2020-01-01 RX ORDER — NEOMYCIN/BACITRACIN/POLYMYXINB 3.5-400-5K
OINTMENT (GRAM) TOPICAL 3 TIMES DAILY PRN
Status: DISCONTINUED | OUTPATIENT
Start: 2020-01-01 | End: 2020-01-01 | Stop reason: HOSPADM

## 2020-01-01 RX ORDER — AMOXICILLIN 250 MG
2 CAPSULE ORAL DAILY PRN
Status: DISCONTINUED | OUTPATIENT
Start: 2020-01-01 | End: 2020-01-01 | Stop reason: HOSPADM

## 2020-01-01 RX ORDER — AMLODIPINE BESYLATE 10 MG/1
10 TABLET ORAL AT BEDTIME
COMMUNITY

## 2020-01-01 RX ORDER — ASPIRIN 81 MG/1
81 TABLET, CHEWABLE ORAL DAILY
Status: DISCONTINUED | OUTPATIENT
Start: 2020-01-01 | End: 2020-01-01 | Stop reason: HOSPADM

## 2020-01-01 RX ORDER — ACETAMINOPHEN 325 MG/1
650 TABLET ORAL EVERY 4 HOURS PRN
Status: DISCONTINUED | OUTPATIENT
Start: 2020-01-01 | End: 2020-01-01 | Stop reason: HOSPADM

## 2020-01-01 RX ORDER — GENTAMICIN SULFATE 1 MG/G
CREAM TOPICAL DAILY
Status: DISCONTINUED | OUTPATIENT
Start: 2020-01-01 | End: 2020-01-01 | Stop reason: HOSPADM

## 2020-01-01 RX ORDER — ONDANSETRON 2 MG/ML
4 INJECTION INTRAMUSCULAR; INTRAVENOUS EVERY 6 HOURS PRN
Status: DISCONTINUED | OUTPATIENT
Start: 2020-01-01 | End: 2020-01-01 | Stop reason: HOSPADM

## 2020-01-01 RX ORDER — ASPIRIN 81 MG/1
81 TABLET, CHEWABLE ORAL DAILY
COMMUNITY

## 2020-01-01 RX ORDER — HYDROMORPHONE HYDROCHLORIDE 2 MG/1
2 TABLET ORAL
Status: DISCONTINUED | OUTPATIENT
Start: 2020-01-01 | End: 2020-01-01

## 2020-01-01 RX ORDER — GABAPENTIN 100 MG/1
300 CAPSULE ORAL AT BEDTIME
Qty: 90 CAPSULE | Refills: 0 | Status: SHIPPED | OUTPATIENT
Start: 2020-01-01 | End: 2020-02-29

## 2020-01-01 RX ORDER — MULTIPLE VITAMINS W/ MINERALS TAB 9MG-400MCG
1 TAB ORAL DAILY
Status: DISCONTINUED | OUTPATIENT
Start: 2020-01-01 | End: 2020-01-01 | Stop reason: HOSPADM

## 2020-01-01 RX ORDER — CALCIUM CARBONATE 500 MG/1
1 TABLET, CHEWABLE ORAL 2 TIMES DAILY
COMMUNITY

## 2020-01-01 RX ORDER — DULOXETIN HYDROCHLORIDE 20 MG/1
20 CAPSULE, DELAYED RELEASE ORAL DAILY
Qty: 30 CAPSULE | Refills: 0 | Status: SHIPPED | OUTPATIENT
Start: 2020-01-01 | End: 2020-02-29

## 2020-01-01 RX ORDER — HYDRALAZINE HYDROCHLORIDE 20 MG/ML
2 INJECTION INTRAMUSCULAR; INTRAVENOUS EVERY 4 HOURS PRN
Status: DISCONTINUED | OUTPATIENT
Start: 2020-01-01 | End: 2020-01-01 | Stop reason: HOSPADM

## 2020-01-01 RX ORDER — FINASTERIDE 5 MG/1
5 TABLET, FILM COATED ORAL DAILY
Status: DISCONTINUED | OUTPATIENT
Start: 2020-01-01 | End: 2020-01-01 | Stop reason: HOSPADM

## 2020-01-01 RX ORDER — SODIUM POLYSTYRENE SULFONATE 15 G/60ML
15 SUSPENSION ORAL; RECTAL ONCE
Status: COMPLETED | OUTPATIENT
Start: 2020-01-01 | End: 2020-01-01

## 2020-01-01 RX ORDER — TRAZODONE HYDROCHLORIDE 50 MG/1
50 TABLET, FILM COATED ORAL
COMMUNITY

## 2020-01-01 RX ORDER — NICOTINE 21 MG/24HR
1 PATCH, TRANSDERMAL 24 HOURS TRANSDERMAL DAILY
Status: DISCONTINUED | OUTPATIENT
Start: 2020-01-01 | End: 2020-01-01 | Stop reason: HOSPADM

## 2020-01-01 RX ORDER — FINASTERIDE 5 MG/1
5 TABLET, FILM COATED ORAL DAILY
Status: DISCONTINUED | OUTPATIENT
Start: 2020-01-01 | End: 2020-01-01

## 2020-01-01 RX ORDER — HYDRALAZINE HYDROCHLORIDE 20 MG/ML
2 INJECTION INTRAMUSCULAR; INTRAVENOUS EVERY 4 HOURS PRN
Status: DISCONTINUED | OUTPATIENT
Start: 2020-01-01 | End: 2020-01-01

## 2020-01-01 RX ORDER — MIRTAZAPINE 15 MG/1
15 TABLET, FILM COATED ORAL AT BEDTIME
Status: DISCONTINUED | OUTPATIENT
Start: 2020-01-01 | End: 2020-01-01 | Stop reason: HOSPADM

## 2020-01-01 RX ORDER — TRAZODONE HYDROCHLORIDE 50 MG/1
50 TABLET, FILM COATED ORAL AT BEDTIME
Status: DISCONTINUED | OUTPATIENT
Start: 2020-01-01 | End: 2020-01-01 | Stop reason: HOSPADM

## 2020-01-01 RX ORDER — NICOTINE 21 MG/24HR
1 PATCH, TRANSDERMAL 24 HOURS TRANSDERMAL DAILY PRN
COMMUNITY

## 2020-01-01 RX ORDER — HEPARIN SODIUM 5000 [USP'U]/.5ML
5000 INJECTION, SOLUTION INTRAVENOUS; SUBCUTANEOUS EVERY 12 HOURS
Status: DISCONTINUED | OUTPATIENT
Start: 2020-01-01 | End: 2020-01-01 | Stop reason: HOSPADM

## 2020-01-01 RX ORDER — CARVEDILOL 25 MG/1
25 TABLET ORAL 2 TIMES DAILY WITH MEALS
Status: DISCONTINUED | OUTPATIENT
Start: 2020-01-01 | End: 2020-01-01 | Stop reason: HOSPADM

## 2020-01-01 RX ORDER — SIMVASTATIN 20 MG
20 TABLET ORAL AT BEDTIME
Status: DISCONTINUED | OUTPATIENT
Start: 2020-01-01 | End: 2020-01-01 | Stop reason: HOSPADM

## 2020-01-01 RX ORDER — CARVEDILOL 12.5 MG/1
12.5 TABLET ORAL 2 TIMES DAILY
Qty: 60 TABLET | Refills: 0 | Status: SHIPPED | OUTPATIENT
Start: 2020-01-01 | End: 2020-02-29

## 2020-01-01 RX ORDER — HYDROCODONE BITARTRATE AND ACETAMINOPHEN 5; 325 MG/1; MG/1
1 TABLET ORAL EVERY 6 HOURS PRN
Status: DISCONTINUED | OUTPATIENT
Start: 2020-01-01 | End: 2020-01-01 | Stop reason: HOSPADM

## 2020-01-01 RX ORDER — HEPARIN SODIUM 1000 [USP'U]/ML
INJECTION, SOLUTION INTRAVENOUS; SUBCUTANEOUS
COMMUNITY

## 2020-01-01 RX ORDER — AMLODIPINE BESYLATE 10 MG/1
10 TABLET ORAL DAILY
Status: DISCONTINUED | OUTPATIENT
Start: 2020-01-01 | End: 2020-01-01 | Stop reason: HOSPADM

## 2020-01-01 RX ORDER — DEXTROSE MONOHYDRATE 25 G/50ML
50 INJECTION, SOLUTION INTRAVENOUS ONCE
Status: COMPLETED | OUTPATIENT
Start: 2020-01-01 | End: 2020-01-01

## 2020-01-01 RX ORDER — DEXTROSE MONOHYDRATE 25 G/50ML
50 INJECTION, SOLUTION INTRAVENOUS CONTINUOUS
Status: DISCONTINUED | OUTPATIENT
Start: 2020-01-01 | End: 2020-01-01 | Stop reason: CLARIF

## 2020-01-01 RX ORDER — FENTANYL 12.5 UG/1
12 PATCH TRANSDERMAL
Status: DISCONTINUED | OUTPATIENT
Start: 2020-01-01 | End: 2020-01-01 | Stop reason: HOSPADM

## 2020-01-01 RX ORDER — CALCIUM GLUCONATE 94 MG/ML
1 INJECTION, SOLUTION INTRAVENOUS ONCE
Status: DISCONTINUED | OUTPATIENT
Start: 2020-01-01 | End: 2020-01-01 | Stop reason: CLARIF

## 2020-01-01 RX ORDER — HYDROCODONE BITARTRATE AND ACETAMINOPHEN 5; 325 MG/1; MG/1
1 TABLET ORAL EVERY 4 HOURS PRN
Status: DISCONTINUED | OUTPATIENT
Start: 2020-01-01 | End: 2020-01-01

## 2020-01-01 RX ORDER — FINASTERIDE 5 MG/1
5 TABLET, FILM COATED ORAL DAILY
COMMUNITY

## 2020-01-01 RX ORDER — MIRTAZAPINE 15 MG/1
15 TABLET, FILM COATED ORAL AT BEDTIME
COMMUNITY

## 2020-01-01 RX ORDER — TRAMADOL HYDROCHLORIDE 50 MG/1
50 TABLET ORAL 4 TIMES DAILY PRN
Status: DISCONTINUED | OUTPATIENT
Start: 2020-01-01 | End: 2020-01-01

## 2020-01-01 RX ORDER — CALCIUM CARBONATE 500 MG/1
500 TABLET, CHEWABLE ORAL 3 TIMES DAILY PRN
Status: DISCONTINUED | OUTPATIENT
Start: 2020-01-01 | End: 2020-01-01 | Stop reason: HOSPADM

## 2020-01-01 RX ORDER — CALCITRIOL 0.25 UG/1
0.25 CAPSULE, LIQUID FILLED ORAL AT BEDTIME
COMMUNITY

## 2020-01-01 RX ORDER — TAMSULOSIN HYDROCHLORIDE 0.4 MG/1
0.4 CAPSULE ORAL DAILY
Status: DISCONTINUED | OUTPATIENT
Start: 2020-01-01 | End: 2020-01-01 | Stop reason: HOSPADM

## 2020-01-01 RX ORDER — GENTAMICIN SULFATE 1 MG/G
1 CREAM TOPICAL DAILY
COMMUNITY
Start: 2016-05-24

## 2020-01-01 RX ORDER — GABAPENTIN 100 MG/1
100 CAPSULE ORAL AT BEDTIME
Status: DISCONTINUED | OUTPATIENT
Start: 2020-01-01 | End: 2020-01-01 | Stop reason: HOSPADM

## 2020-01-01 RX ORDER — ACETAMINOPHEN 650 MG/1
650 SUPPOSITORY RECTAL EVERY 4 HOURS PRN
Status: DISCONTINUED | OUTPATIENT
Start: 2020-01-01 | End: 2020-01-01 | Stop reason: HOSPADM

## 2020-01-01 RX ORDER — SODIUM POLYSTYRENE SULFONATE 15 G/60ML
30 SUSPENSION ORAL; RECTAL ONCE
Status: COMPLETED | OUTPATIENT
Start: 2020-01-01 | End: 2020-01-01

## 2020-01-01 RX ORDER — DULOXETIN HYDROCHLORIDE 20 MG/1
20 CAPSULE, DELAYED RELEASE ORAL DAILY
Status: DISCONTINUED | OUTPATIENT
Start: 2020-01-01 | End: 2020-01-01 | Stop reason: HOSPADM

## 2020-01-01 RX ORDER — TRAMADOL HYDROCHLORIDE 50 MG/1
50 TABLET ORAL 3 TIMES DAILY PRN
Status: DISCONTINUED | OUTPATIENT
Start: 2020-01-01 | End: 2020-01-01 | Stop reason: ALTCHOICE

## 2020-01-01 RX ORDER — NALOXONE HYDROCHLORIDE 0.4 MG/ML
.1-.4 INJECTION, SOLUTION INTRAMUSCULAR; INTRAVENOUS; SUBCUTANEOUS
Status: DISCONTINUED | OUTPATIENT
Start: 2020-01-01 | End: 2020-01-01 | Stop reason: HOSPADM

## 2020-01-01 RX ORDER — NEOMYCIN/BACITRACIN/POLYMYXINB 3.5-400-5K
OINTMENT (GRAM) TOPICAL 3 TIMES DAILY PRN
COMMUNITY

## 2020-01-01 RX ORDER — ACETAMINOPHEN 325 MG/1
650 TABLET ORAL EVERY 4 HOURS PRN
Qty: 90 TABLET | Refills: 0 | Status: SHIPPED | OUTPATIENT
Start: 2020-01-01 | End: 2020-02-29

## 2020-01-01 RX ORDER — ONDANSETRON 4 MG/1
4 TABLET, ORALLY DISINTEGRATING ORAL EVERY 6 HOURS PRN
Status: DISCONTINUED | OUTPATIENT
Start: 2020-01-01 | End: 2020-01-01 | Stop reason: HOSPADM

## 2020-01-01 RX ORDER — NICOTINE POLACRILEX 4 MG
15-30 LOZENGE BUCCAL
Status: DISCONTINUED | OUTPATIENT
Start: 2020-01-01 | End: 2020-01-01 | Stop reason: HOSPADM

## 2020-01-01 RX ORDER — CINACALCET 30 MG/1
30 TABLET, FILM COATED ORAL DAILY
COMMUNITY

## 2020-01-01 RX ORDER — DEXTROSE MONOHYDRATE 25 G/50ML
25-50 INJECTION, SOLUTION INTRAVENOUS
Status: DISCONTINUED | OUTPATIENT
Start: 2020-01-01 | End: 2020-01-01 | Stop reason: HOSPADM

## 2020-01-01 RX ORDER — CALCITRIOL 0.25 UG/1
0.25 CAPSULE, LIQUID FILLED ORAL AT BEDTIME
Status: DISCONTINUED | OUTPATIENT
Start: 2020-01-01 | End: 2020-01-01 | Stop reason: HOSPADM

## 2020-01-01 RX ADMIN — CARVEDILOL 25 MG: 25 TABLET, FILM COATED ORAL at 17:33

## 2020-01-01 RX ADMIN — TRAMADOL HYDROCHLORIDE 50 MG: 50 TABLET, FILM COATED ORAL at 21:27

## 2020-01-01 RX ADMIN — DULOXETINE HYDROCHLORIDE 20 MG: 20 CAPSULE, DELAYED RELEASE ORAL at 21:02

## 2020-01-01 RX ADMIN — ACETAMINOPHEN 650 MG: 325 TABLET, FILM COATED ORAL at 21:07

## 2020-01-01 RX ADMIN — CALCITRIOL 0.25 MCG: 0.25 CAPSULE, LIQUID FILLED ORAL at 21:42

## 2020-01-01 RX ADMIN — MULTIPLE VITAMINS W/ MINERALS TAB 1 TABLET: TAB at 08:57

## 2020-01-01 RX ADMIN — Medication 2 PATCH: at 08:52

## 2020-01-01 RX ADMIN — NICOTINE 1 PATCH: 21 PATCH, EXTENDED RELEASE TRANSDERMAL at 08:50

## 2020-01-01 RX ADMIN — CALCITRIOL 0.25 MCG: 0.25 CAPSULE, LIQUID FILLED ORAL at 21:02

## 2020-01-01 RX ADMIN — SODIUM POLYSTYRENE SULFONATE 15 G: 15 SUSPENSION ORAL; RECTAL at 19:49

## 2020-01-01 RX ADMIN — AMLODIPINE BESYLATE 10 MG: 10 TABLET ORAL at 09:57

## 2020-01-01 RX ADMIN — SODIUM POLYSTYRENE SULFONATE 30 G: 15 SUSPENSION ORAL; RECTAL at 06:46

## 2020-01-01 RX ADMIN — DULOXETINE HYDROCHLORIDE 20 MG: 20 CAPSULE, DELAYED RELEASE ORAL at 21:42

## 2020-01-01 RX ADMIN — FINASTERIDE 5 MG: 5 TABLET, FILM COATED ORAL at 08:55

## 2020-01-01 RX ADMIN — CARVEDILOL 25 MG: 25 TABLET, FILM COATED ORAL at 09:57

## 2020-01-01 RX ADMIN — HYDROCODONE BITARTRATE AND ACETAMINOPHEN 1 TABLET: 5; 325 TABLET ORAL at 09:00

## 2020-01-01 RX ADMIN — FINASTERIDE 5 MG: 5 TABLET, FILM COATED ORAL at 09:57

## 2020-01-01 RX ADMIN — Medication 2 PATCH: at 09:58

## 2020-01-01 RX ADMIN — NICOTINE 1 PATCH: 21 PATCH, EXTENDED RELEASE TRANSDERMAL at 21:52

## 2020-01-01 RX ADMIN — GENTAMICIN SULFATE: 1 CREAM TOPICAL at 18:32

## 2020-01-01 RX ADMIN — NICOTINE 1 PATCH: 21 PATCH, EXTENDED RELEASE TRANSDERMAL at 10:05

## 2020-01-01 RX ADMIN — CALCIUM GLUCONATE 1 G: 98 INJECTION, SOLUTION INTRAVENOUS at 13:02

## 2020-01-01 RX ADMIN — TRAMADOL HYDROCHLORIDE 50 MG: 50 TABLET, FILM COATED ORAL at 05:28

## 2020-01-01 RX ADMIN — ASPIRIN 81 MG 81 MG: 81 TABLET ORAL at 09:57

## 2020-01-01 RX ADMIN — SIMVASTATIN 20 MG: 20 TABLET, FILM COATED ORAL at 21:06

## 2020-01-01 RX ADMIN — HYDROMORPHONE HYDROCHLORIDE 2 MG: 2 TABLET ORAL at 21:07

## 2020-01-01 RX ADMIN — AMLODIPINE BESYLATE 10 MG: 10 TABLET ORAL at 08:32

## 2020-01-01 RX ADMIN — FENTANYL 1 PATCH: 12 PATCH TRANSDERMAL at 23:43

## 2020-01-01 RX ADMIN — MIRTAZAPINE 15 MG: 15 TABLET, FILM COATED ORAL at 21:02

## 2020-01-01 RX ADMIN — DEXTROSE 50 % IN WATER (D50W) INTRAVENOUS SYRINGE 50 ML: at 13:34

## 2020-01-01 RX ADMIN — GABAPENTIN 100 MG: 100 CAPSULE ORAL at 21:02

## 2020-01-01 RX ADMIN — TRAMADOL HYDROCHLORIDE 50 MG: 50 TABLET, FILM COATED ORAL at 02:41

## 2020-01-01 RX ADMIN — MIRTAZAPINE 15 MG: 15 TABLET, FILM COATED ORAL at 21:07

## 2020-01-01 RX ADMIN — ASPIRIN 81 MG 81 MG: 81 TABLET ORAL at 08:56

## 2020-01-01 RX ADMIN — GENTAMICIN SULFATE: 1 CREAM TOPICAL at 17:16

## 2020-01-01 RX ADMIN — CARVEDILOL 25 MG: 25 TABLET, FILM COATED ORAL at 17:05

## 2020-01-01 RX ADMIN — TRAZODONE HYDROCHLORIDE 50 MG: 50 TABLET ORAL at 21:07

## 2020-01-01 RX ADMIN — TRAZODONE HYDROCHLORIDE 50 MG: 50 TABLET ORAL at 21:02

## 2020-01-01 RX ADMIN — TRAZODONE HYDROCHLORIDE 50 MG: 50 TABLET ORAL at 21:49

## 2020-01-01 RX ADMIN — TAMSULOSIN HYDROCHLORIDE 0.4 MG: 0.4 CAPSULE ORAL at 21:02

## 2020-01-01 RX ADMIN — CARVEDILOL 25 MG: 25 TABLET, FILM COATED ORAL at 08:32

## 2020-01-01 RX ADMIN — MULTIPLE VITAMINS W/ MINERALS TAB 1 TABLET: TAB at 08:31

## 2020-01-01 RX ADMIN — HYDROCODONE BITARTRATE AND ACETAMINOPHEN 1 TABLET: 5; 325 TABLET ORAL at 00:54

## 2020-01-01 RX ADMIN — MULTIPLE VITAMINS W/ MINERALS TAB 1 TABLET: TAB at 09:57

## 2020-01-01 RX ADMIN — ASPIRIN 81 MG 81 MG: 81 TABLET ORAL at 08:31

## 2020-01-01 RX ADMIN — HYDROCODONE BITARTRATE AND ACETAMINOPHEN 1 TABLET: 5; 325 TABLET ORAL at 10:32

## 2020-01-01 RX ADMIN — SIMVASTATIN 20 MG: 20 TABLET, FILM COATED ORAL at 21:02

## 2020-01-01 RX ADMIN — FINASTERIDE 5 MG: 5 TABLET, FILM COATED ORAL at 08:31

## 2020-01-01 RX ADMIN — INSULIN HUMAN 10 UNITS: 100 INJECTION, SOLUTION PARENTERAL at 13:28

## 2020-01-01 RX ADMIN — SIMVASTATIN 20 MG: 20 TABLET, FILM COATED ORAL at 21:07

## 2020-01-01 RX ADMIN — MIRTAZAPINE 15 MG: 15 TABLET, FILM COATED ORAL at 21:43

## 2020-01-01 RX ADMIN — SIMVASTATIN 20 MG: 20 TABLET, FILM COATED ORAL at 21:42

## 2020-01-01 RX ADMIN — TAMSULOSIN HYDROCHLORIDE 0.4 MG: 0.4 CAPSULE ORAL at 21:43

## 2020-01-01 RX ADMIN — AMLODIPINE BESYLATE 10 MG: 10 TABLET ORAL at 08:56

## 2020-01-01 RX ADMIN — NICOTINE 1 PATCH: 21 PATCH, EXTENDED RELEASE TRANSDERMAL at 08:33

## 2020-01-01 RX ADMIN — HYDROCODONE BITARTRATE AND ACETAMINOPHEN 1 TABLET: 5; 325 TABLET ORAL at 05:05

## 2020-01-01 RX ADMIN — CARVEDILOL 25 MG: 25 TABLET, FILM COATED ORAL at 09:01

## 2020-01-01 ASSESSMENT — ENCOUNTER SYMPTOMS
PALPITATIONS: 0
WHEEZING: 0
ABDOMINAL PAIN: 0
EYE REDNESS: 0
HEADACHES: 0
NECK STIFFNESS: 0
FEVER: 0
ARTHRALGIAS: 0
DIFFICULTY URINATING: 0
FEVER: 0
NECK STIFFNESS: 0
CONFUSION: 0
ARTHRALGIAS: 0
HEADACHES: 0
DIFFICULTY URINATING: 0
CONFUSION: 0
EYE REDNESS: 0
ABDOMINAL PAIN: 0
COLOR CHANGE: 0
COLOR CHANGE: 0
SHORTNESS OF BREATH: 0

## 2020-01-01 ASSESSMENT — MIFFLIN-ST. JEOR: SCORE: 1620.88

## 2020-01-23 NOTE — ED TRIAGE NOTES
Pt c/o of increased weakness. Pt had stroke in March 2018. Pt also has right shoulder pain that is worsening and went to PT today but hasn't gone all winter. Pt would also like social service consult, needs more help at home.

## 2020-01-23 NOTE — ED NOTES
"Emergency Department Assessment  Reason for Referral: Consulted due to increased weakness.   PCP: Liam Hallman  Specialists or  providers: Katt Landon Nephrologist  Medication routine/concerns : Pt's Brother helps with medication management. Pt receives dialysis at home, as delivered. Pt completes the process on his own. Pt states he has experienced recent difficulty hanging the bag due to arm weakness.  Cognitive Behavioral Status: alert    Affect and Mood Status: Pt appears fatigued. Pt was emotional, crying, expressed pain in shoulder. Updated Dr. Jiang.       Mental Health History: Depressed. Pt reports he has been \"down\" due to current pain. Pt reports he is taking an anti-depressant and agrees to accept mental health provider resources on discharge.       Recent Stressors: Pt reports declining health, pain and weakness. Updated Dr. Jiang.      Support System: Pt's brother is supportive.     Transportation: Pt utilizing his brother for transportation.     Current Living Situation:    Living Situation:Alone  Private home health aid who helps with bathing and housekeeping 2x per week. Pt also receives help with snow removal.       Patient's insurance coverage: [unfilled]    Previous Services at Home or in the Community:[facility] [place] outpatient therapy.   Falls at home?: Pt fell two days ago due to dizziness. Pt reports that he was not injured. Pt was able to get up afterward.     ADL's: Independent   Equipment at home?: Yes, dialysis equipment.   O2: no    Any Violence in the home?: denies  Do you feel safe at home?: yes      Plan: home with homecare Pt/family was given the Medicare Compare list for Home Care, with associated star ratings to assist with choice for referrals/discharge planning Yes    Education was given to pt/family that star ratings are updated/maintained by Medicare and can be reviewed by visiting www.medicare.gov Yes     Per discharge planning, the " choice of vendor list has been provided to the patient/family for homecare.    First Choice : [facility] [place]: [facility]                                                      [phone]    Second Choice: [facility] [place]: [facility]                 [phone]      Referral sent to Brenda of [facility] advised they can accept patient for home PT and skilled nursing visits. They plan to see him tomorrow for an assessment.

## 2020-01-23 NOTE — ED PROVIDER NOTES
History     Chief Complaint   Patient presents with     Generalized Weakness     HPI  Stuart Stewart is a 64 year old male who presented to the emergency department accompanied by the brother.  Most of the history was given by the patient's brother who reports that patient has had progressively worsening weakness on the right side of the body.  He had a stroke in 2018 with residual weakness on the right side of the body.  Patient had been undergoing physical therapy but discontinued therapy sometime in the past few months.  He has then noted worsening generalized body weakness, weakness in the right upper extremity and now is unable to use the right upper extremity completely.  He is not able to carry ADLs independently and requires a lot of help.  He denies any worsening of slurring of speech, facial droop.  He is not on any blood thinners.  He fell down a few weeks ago because of the generalized weakness.    Allergies:  No Known Allergies    Problem List:    There are no active problems to display for this patient.       Past Medical History:    No past medical history on file.    Past Surgical History:    No past surgical history on file.    Family History:    No family history on file.    Social History:  Marital Status:   [4]  Social History     Tobacco Use     Smoking status: Current Every Day Smoker     Packs/day: 2.00     Smokeless tobacco: Never Used   Substance Use Topics     Alcohol use: No     Drug use: No        Medications:    amLODIPine (NORVASC) 10 MG tablet  aspirin (ASA) 81 MG chewable tablet  B Complex-C-Folic Acid (DIALYVITE PO)  Carvedilol (COREG PO)  FINASTERIDE PO  mirtazapine (REMERON) 15 MG tablet  Simvastatin (ZOCOR PO)  tamsulosin (FLOMAX) 0.4 MG 24 hr capsule  traZODone (DESYREL) 50 MG tablet  UNABLE TO FIND  calcium carbonate (TUMS) 500 MG chewable tablet  senna-docusate (SENOKOT-S;PERICOLACE) 8.6-50 MG per tablet          Review of Systems   Constitutional: Negative for fever.    HENT: Negative for congestion.    Eyes: Negative for redness.   Respiratory: Negative for shortness of breath.    Cardiovascular: Negative for chest pain.   Gastrointestinal: Negative for abdominal pain.   Genitourinary: Negative for difficulty urinating.   Musculoskeletal: Negative for arthralgias and neck stiffness.   Skin: Negative for color change.   Neurological: Negative for headaches.   Psychiatric/Behavioral: Negative for confusion.   All other systems reviewed and are negative.      Physical Exam   BP: 150/98  Heart Rate: 63  Temp: 97.4  F (36.3  C)  Resp: 16      Physical Exam  Vitals signs and nursing note reviewed.   Constitutional:       General: He is not in acute distress.     Appearance: He is well-developed. He is not diaphoretic.   HENT:      Head: Normocephalic and atraumatic.   Eyes:      General: No scleral icterus.     Pupils: Pupils are equal, round, and reactive to light.   Cardiovascular:      Rate and Rhythm: Normal rate and regular rhythm.      Heart sounds: Normal heart sounds.   Pulmonary:      Effort: Pulmonary effort is normal. No respiratory distress.      Breath sounds: Normal breath sounds. No stridor.   Abdominal:      General: Bowel sounds are normal. There is no distension.      Palpations: Abdomen is soft.      Tenderness: There is no abdominal tenderness.   Musculoskeletal:         General: No tenderness or deformity.   Skin:     General: Skin is warm.      Findings: No rash.   Neurological:      Mental Status: He is alert and oriented to person, place, and time.      Motor: Weakness present.      Comments: Power grade 3/5 in the right upper extremity.  Normal sensation.  Normal power and sensation in all other extremity's.         ED Course        Procedures       EKG Interpretation:      Interpreted by Kraig Jiang MD  Time reviewed: 12:40 PM  Symptoms at time of EKG: General weakness  Rhythm: normal sinus   Rate: normal  Axis: Left axis deviation  Ectopy:  none  Conduction: normal  ST Segments/ T Waves: No ST-T wave changes  Q Waves: none  Comparison to prior: No old EKG available    Clinical Impression: Sinus rhythm with first-degree AV block and left axis deviation.        Results for orders placed or performed during the hospital encounter of 01/23/20 (from the past 24 hour(s))   CBC with platelets differential   Result Value Ref Range    WBC 7.9 4.0 - 11.0 10e9/L    RBC Count 2.99 (L) 4.4 - 5.9 10e12/L    Hemoglobin 9.7 (L) 13.3 - 17.7 g/dL    Hematocrit 30.2 (L) 40.0 - 53.0 %     (H) 78 - 100 fl    MCH 32.4 26.5 - 33.0 pg    MCHC 32.1 31.5 - 36.5 g/dL    RDW 14.6 10.0 - 15.0 %    Platelet Count 190 150 - 450 10e9/L    Diff Method Automated Method     % Neutrophils 75.6 %    % Lymphocytes 13.9 %    % Monocytes 5.4 %    % Eosinophils 3.9 %    % Basophils 0.8 %    % Immature Granulocytes 0.4 %    Nucleated RBCs 0 0 /100    Absolute Neutrophil 6.0 1.6 - 8.3 10e9/L    Absolute Lymphocytes 1.1 0.8 - 5.3 10e9/L    Absolute Monocytes 0.4 0.0 - 1.3 10e9/L    Absolute Eosinophils 0.3 0.0 - 0.7 10e9/L    Absolute Basophils 0.1 0.0 - 0.2 10e9/L    Abs Immature Granulocytes 0.0 0 - 0.4 10e9/L    Absolute Nucleated RBC 0.0    Comprehensive metabolic panel   Result Value Ref Range    Sodium 133 133 - 144 mmol/L    Potassium 6.3 (HH) 3.4 - 5.3 mmol/L    Chloride 103 94 - 109 mmol/L    Carbon Dioxide 18 (L) 20 - 32 mmol/L    Anion Gap 12 3 - 14 mmol/L    Glucose 81 70 - 99 mg/dL    Urea Nitrogen 100 (H) 7 - 30 mg/dL    Creatinine 10.00 (H) 0.66 - 1.25 mg/dL    GFR Estimate 5 (L) >60 mL/min/[1.73_m2]    GFR Estimate If Black 6 (L) >60 mL/min/[1.73_m2]    Calcium 8.6 8.5 - 10.1 mg/dL    Bilirubin Total 0.4 0.2 - 1.3 mg/dL    Albumin 3.2 (L) 3.4 - 5.0 g/dL    Protein Total 6.6 (L) 6.8 - 8.8 g/dL    Alkaline Phosphatase 104 40 - 150 U/L    ALT 29 0 - 70 U/L    AST 8 0 - 45 U/L   CRP inflammation   Result Value Ref Range    CRP Inflammation <2.9 0.0 - 8.0 mg/L   Erythrocyte  sedimentation rate auto   Result Value Ref Range    Sed Rate 21 (H) 0 - 20 mm/h   Lactic acid whole blood   Result Value Ref Range    Lactic Acid 0.4 (L) 0.7 - 2.0 mmol/L   Troponin I   Result Value Ref Range    Troponin I ES <0.015 0.000 - 0.045 ug/L   TSH   Result Value Ref Range    TSH 1.14 0.40 - 4.00 mU/L   CT Head w/o Contrast    Narrative    PROCEDURE: CT HEAD W/O CONTRAST     HISTORY: Stroke, follow up; ; Worsening weakness on the right.    COMPARISON: March 2018    TECHNIQUE:  Helical images of the head from the foramen magnum to the  vertex were obtained without contrast.    FINDINGS: There is an old infarct in the posterior parietal region on  the right. This is unchanged from previous examination. A infarct has  also developed as compared to previous examination in the centrum  semiovale on the left. This represents a change but does not represent  an acute infarct. The cortical sulci and ventricular system are normal  for age. There is extensive white matter low-density both hemispheres  consistent with small vessel disease. Cranial vault is intact.  The  visualized paranasal sinuses are clear.      Impression    IMPRESSION: Old infarcts in both hemispheres. No acute brain  abnormality.      DACIA OSPINA MD   Glucose by meter   Result Value Ref Range    Glucose 100 (H) 70 - 99 mg/dL   Potassium   Result Value Ref Range    Potassium 6.0 (H) 3.4 - 5.3 mmol/L       Medications   glucose gel 15-30 g (has no administration in time range)     Or   dextrose 50 % injection 25-50 mL (has no administration in time range)     Or   glucagon injection 1 mg (has no administration in time range)   insulin (regular) (HumuLIN R/NovoLIN R) injection 10 Units (10 Units Intravenous Given 1/23/20 1328)   dextrose 50 % injection 50 mL (50 mLs Intravenous Given 1/23/20 1334)   calcium gluconate 1 g in D5W 100 mL intermittent infusion (0 g Intravenous Stopped 1/23/20 1328)       Assessments & Plan (with Medical Decision  Making)   Generalized weakness: Multifactorial secondary to prior CVA with residual right-sided weakness.  : Consulted will try and see if patient can have home therapy and home health services.  Right upper extremity weakness: Residual after stroke.  Will require intensive physical therapy.   will organize for this.  Hyperkalemia: IV Humulin 10 units with 50% dextrose 50 mL's given and repeat potassium was 6 mmol/L.  Patient abruptly requested to be let go home and signed AMA before treatment of this could be completed.  Chronic kidney disease stage V: Currently on peritoneal dialysis at home.  Has been on this for several years.  Patient is not very compliant per the brother who accompanied him to the ED.  He follows up with a nephrologist.  Advised to keep his appointment and follow-up especially in view of the hyperkalemia.    I have reviewed the nursing notes.    I have reviewed the findings, diagnosis, plan and need for follow up with the patient.    Discharge Medication List as of 1/23/2020  2:47 PM          Final diagnoses:   Hyperkalemia   Generalized muscle weakness   CKD (chronic kidney disease) stage 5, GFR less than 15 ml/min (H)       1/23/2020   [facility]     Kraig Jiang MD  01/23/20 9808

## 2020-01-27 PROBLEM — R53.1 WEAKNESS: Status: ACTIVE | Noted: 2020-01-01

## 2020-01-28 NOTE — PROGRESS NOTES
[facility]    Hospitalist Progress Note      Assessment & Plan   Stuart Stewart is a 64 year old male who was admitted on 1/27/2020.  He has history of hypertension, hyperlipidemia, end-stage renal disease on peritoneal dialysis, history of CVA with right-sided hemiparesis and expressive dysphasia presented to the ED with inability to take care of himself at home.  His issues are the following:    Hyperkalemia: Due to end-stage renal disease.  The patient probably has not been able to dialyze himself optimally.  Today potassium levels are 5.5.  I will go ahead and give him 1 more dose of Kayexalate.  He probably chronically runs high.  Continue to monitor for now.    End-stage renal disease: The patient is on peritoneal dialysis.  He manages it himself.  He has equipment with him.  He is a candidate for hemodialysis but refuses to get hemodialyzed.  He will need placement at a nursing home for which he is here and at that point his peritoneal dialysis can be done optimally over there.    Hypertension: Continue home medications.  He is on Coreg and amlodipine.    Chronic pain issues: The patient was started on fentanyl patch last night and tramadol for breakthrough pain.    Depression: The patient is sad but not suicidal.  Continue on Remeron.  He feels helpless overall.    Tobacco dependence: Continue with nicotine patch    DVT Prophylaxis: Heparin SQ  Code Status: Full code  Disposition: Expected discharge in 1-2 days once placement is found    Discussed with the patient in detail.    Gregoria Emmanuel    Interval History   The patient seen and examined.  Overnight events reviewed.  Discussed with the nursing staff.  He does have chronic pain issues.  Overall feels sad that he was overwhelmed at home and was not able to take care of himself.  He is told me that he has not been able to do his peritoneal dialysis effectively.  He has right-sided hemiparesis compromising his ability to keep up with activities of  daily living as well as his dialysis.  Although he has been doing his shopping and has been living independently.    -Data reviewed today: I reviewed all new labs and imaging results over the last 24 hours. I personally reviewed no images or EKG's today.    Physical Exam   Temp: 97.3  F (36.3  C) Temp src: Tympanic BP: 140/83 Pulse: 79 Heart Rate: 84 Resp: 20 SpO2: 94 % O2 Device: None (Room air)    Vitals:    01/27/20 2041   Weight: 77.2 kg (170 lb 3.1 oz)     Vital Signs with Ranges  Temp:  [96.9  F (36.1  C)-98.8  F (37.1  C)] 97.3  F (36.3  C)  Pulse:  [63-79] 79  Heart Rate:  [65-84] 84  Resp:  [20-24] 20  BP: (120-153)/() 140/83  SpO2:  [92 %-96 %] 94 %  I/O last 3 completed shifts:  In: -   Out: 700 [Urine:700]    Peripheral IV 03/10/18 Right Upper forearm (Active)   Number of days: 689       Peripheral IV 03/10/18 Left Lower forearm (Active)   Number of days: 689       Peripheral IV 01/23/20 Left Upper forearm (Active)   Number of days: 5     Line/device assessment(s) completed for medical necessity    General: The patient has expressive aphasia but is interactive, alert oriented hard to understand due to dysphasia  HEENT: PERRLA  Neck: Supple  Lungs: Bilaterally clear to auscultation  Cardiovascular:S1+S2 [place]  Abdomen: Soft, nontender, bowel sounds present, dialysis catheter in place.  No erythema or discharge  Extremities: No pitting edema, pulses bilaterally palpable, no cyanosis  Neuro: Expressive dysphasia, right-sided hemiparesis  Psych: Depressed but not suicidal     Medications       amLODIPine  10 mg Oral Daily     aspirin  81 mg Oral Daily     carvedilol  25 mg Oral BID w/meals     diclofenac  2 g Transdermal 4x Daily     fentaNYL  12 mcg Transdermal Q72H     fentaNYL   Transdermal Q8H     finasteride  5 mg Oral Daily     heparin ANTICOAGULANT  5,000 Units Subcutaneous Q12H     mirtazapine  15 mg Oral At Bedtime     multivitamin w/minerals  1 tablet Oral Daily     nicotine  1 patch  Transdermal Daily     nicotine   Transdermal Q8H     simvastatin  20 mg Oral At Bedtime     tamsulosin  0.4 mg Oral Daily     traZODone  50 mg Oral At Bedtime       Data   Recent Labs   Lab 01/28/20  0507 01/27/20  1842 01/23/20  1421 01/23/20  1145   WBC  --  7.0  --  7.9   HGB  --  9.6*  --  9.7*   MCV  --  99  --  101*   PLT  --  201  --  190    134  --  133   POTASSIUM 5.5* 6.3* 6.0* 6.3*   CHLORIDE 103 101  --  103   CO2 22 21  --  18*   BUN 95* 99*  --  100*   CR 10.60* 10.50*  --  10.00*   ANIONGAP 12 12  --  12   CHELSEA 8.6 8.2*  --  8.6   * 85  --  81   ALBUMIN  --  3.2*  --  3.2*   PROTTOTAL  --  6.3*  --  6.6*   BILITOTAL  --  0.4  --  0.4   ALKPHOS  --  104  --  104   ALT  --  29  --  29   AST  --  11  --  8   TROPI  --  <0.015  --  <0.015       Recent Results (from the past 24 hour(s))   XR Chest Port 1 View    Narrative    PROCEDURE:  XR CHEST PORT 1 VW    HISTORY: cough. .    COMPARISON:  None.    FINDINGS:    The cardiomediastinal contours are magnified by portable technique.  There is calcific aortic atherosclerosis.   Right hemidiaphragm is slightly elevated. There are mild basilar  opacities.      Impression    IMPRESSION:  Streaky bibasilar opacities favor atelectasis or  scarring. Consider follow-up.      CITLALY DUMONT MD

## 2020-01-28 NOTE — PROGRESS NOTES
Assessment completed see flowsheet.    LOC: alert and oriented, has difficulty communicating verbally and so talks slowly, repeats himself if needed, and sometimes uses writing to communicate.   Others present: Patient alone    Dx: weakness  Chronic Disease Management: HTN, Chronic pain, Hx CVA    Lives with: alone  Living at:  Home in [place]  Transportation: YES He was previously driving himself; he tearfully explained that he'd come to the ED twice yesterday. He drove himself here the first time but was unable to get himself out of the car and noone responded to his horn blowing so he drove back home. At home he called the ambulance to     Primary PCP: Liam Hallman  Insurance:  Medicare and Blue Cross Blue Shield    Support System:  Brothers Joe and Jarrell  Homecare/PCA: has had [facility] in the past; but currently privately pays two women (Arielle & Brenda) to help him twice per week for 2 hours each on Tues and Fridays.   /County Services:   none  [place]: NO      How was the [facility] notification completed: na    Health Care Directive: NO verbally says he would want his brothers Joe and Jarrell to speak for him  Guardian: no  POA: Jarrell and Joe    Pharmacy: [facility]  Meds management: YES manages independently    Adequate Resources for needs (housing, utilities, food/med): YES; his current monthly income now consists of $2,400/month from disability  Household chores: Arielle and Brenda do most  Work/community/social activity: Not Applicable     ADLs: independent with the exception of bathing, Arielle/Brenda help with that  Ambulation:independent, uses a cane or a walker if needed  Falls: fell twice recently  Nutrition: was previously independent with shopping and meal prep  Sleep: sleeps in a bed at night    Equipment used: has grab bars; also supports himself using the sink in the bathroom     Oxygen supplier: na      Does the supplier have valid oxygen orders: na    Mental  "health: cried as he described himself as being \"useless\"; declined the offer of mental health supports  Substance abuse: says he now smokes \"all the time\", no alcohol use  Exposure to violence/abuse: denies  Stressors: none additional voiced    Able to Return to Prior Living Arrangements: to be determined    Choice of Vendor: he would like to go to [facility]; he is very insistent that his insurance always pays for everything he needs therefore he believes insurance will pay for this as well. He talked at length about the coverage for past Inpatient Rehab stays. He was somewhat resistive to explanations for the differences in coverage for Inpatient vs STR at a SNF. When asked if he could privately pay for a $5,000 deposit to go to [facility] if needed, he replied that he would not do it and would instead go home.     Barriers: expected that he has no insurance payor, potentially his refusal/inability to pay for SNF stay      Plan: to be determined.   He is willing to go to [facility] if his insurance will pay; but he refuses to pay privately.  Referral was called and faxed to [facility].     If he returns home, he was encouraged to talk with his caregivers Arielle and Brenda to see if they would increase their hours.       "

## 2020-01-28 NOTE — PROGRESS NOTES
Updated Stuart that [facility] reviewed his insurance. If he would still like to go there they will screen him, but he will have to pay $5,000 deposit up front. He refuses at this time.     He is willing to consider increasing the amount of time Arielle and Brenda come help. He is also willing to consider allowing [facility] provide skilled nursing and home PT services if appropriate.

## 2020-01-28 NOTE — H&P
[facility]    History and Physical - Hospitalist Service       Date of Admission:  1/27/2020    Assessment & Plan   Stuart Stewart is a 64 year old male admitted on 1/27/2020.     Weakness: will have PT/OT assessment to assess needs    Unable to maintain self cares: the patient wants SNF placement     Hyperkalemia: kayexalate given in ER; will follow potassium, telemetry (patient refused)    Hypertension: will make prn hydralazine available, but will perform dialysis, since today's session has not been performed    Chronic pain syndrome: affecting both shoulders, left > right, right arm. Given renal failure, will trial Fentanyl Duragesic and Tramadol for breakthrough     Diet: combined  DVT Prophylaxis: Pneumatic Compression Devices (patient refused)  Dominguez Catheter: not present  Code Status: DNR/DNI    Disposition Plan   Expected discharge: Tomorrow, recommended to transitional care unit once placement established.  Entered: Mehul Darden DO 01/27/2020, 8:22 PM     The patient's care was discussed with the Patient, patient's brother    Mehul BAMBI Darden,   [facility]    ______________________________________________________________________    Chief Complaint   Weakness, unable to continue with self cares    History is obtained from the patient and his brother    History of Present Illness   Stuart Stewart is a 64 year old male who has suffered a CVA with left side weakness about two years ago and who has end-stage renal disease on peritoneal dialysis; he is a candidate for hemodialysis, but refuses this treatment. He has been doing his own shopping, meal preparation, ADL's and medical management, but is gradually becoming overwhelmed. His brother now has to help with shopping and in general and notes that his brother is not able to safely live independently    ER Course: vitals were normal except for hypertension (143/123) and he was distraught about his failing abilities. Lab diagnostics  were stable except for potassium (6.3); he was treated with Kayexalate    Review of Systems       Constitutional: No fever or chills, progressive generalized weakness, no unintentional weight change, no particular change in appetite  Ears, Nose & Throat: no sore throat, no nasal drainage, no congestion. No ear pain, no ear drainage, no particular change in hearing  Eyes: no particular change in vision, no redness, no drainage  Cardiovascular: No chest pain at rest, no chest pain with familiar activities.  Pulmonary: No cough, no particular change in work of breathing, no particular change in shortness of breath with position changes  Gastrointestinal: No abdominal pain, no nausea, no vomiting, no diarrhea. No particular change in bowel movement pattern, no black stools, no bloody stools  Genitourinary: he still produces urine and has no complaints and hasn't noted changes in color, stream or odor  Skin: No particular change in bruising, no rashes. Dialysis access appears unchanged to him  Musculoskeletal: no particular change in strength, no particular change in muscle development  Neurological: no numbness and tingling, no headache, no particular change in balance, no dizziness  Psychologic: feels sad about his failing ability, but has taken rational steps to seek help  Endocrine: No particular change in heat or cold intolerance        Past Medical History    I have reviewed this patient's medical history and updated it with pertinent information if needed.   History reviewed. No pertinent past medical history.    Past Surgical History   I have reviewed this patient's surgical history and updated it with pertinent information if needed.  History reviewed. No pertinent surgical history.    Social History   I have reviewed this patient's social history and updated it with pertinent information if needed.  Social History     Tobacco Use     Smoking status: Current Every Day Smoker     Packs/day: 2.00     Smokeless  tobacco: Never Used   Substance Use Topics     Alcohol use: No     Drug use: No       Family History   I have reviewed this patient's family history and updated it with pertinent information if needed.   No family history on file.      Prior to Admission Medications   Prior to Admission Medications   Prescriptions Last Dose Informant Patient Reported? Taking?   B Complex-C-Folic Acid (DIALYVITE PO) 2020 at Unknown time  Yes Yes   Sig: Take 1 tablet by mouth daily   Carvedilol (COREG PO) 2020 at Unknown time  Yes Yes   Sig: Take 25 mg by mouth daily    FINASTERIDE PO 2020 at Unknown time  Yes Yes   Sig: Take 5 mg by mouth daily   Simvastatin (ZOCOR PO) 2020 at Unknown time  Yes Yes   Sig: Take 20 mg by mouth At Bedtime   UNABLE TO FIND Unknown at Unknown time  Yes No   Si mg MEDICATION NAME: Caleitriol 0.25 mg M-F    amLODIPine (NORVASC) 10 MG tablet 2020 at Unknown time  Yes Yes   Sig: Take 10 mg by mouth daily   aspirin (ASA) 81 MG chewable tablet 2020 at Unknown time  Yes Yes   Sig: Take 81 mg by mouth daily   calcium carbonate (TUMS) 500 MG chewable tablet 2020 at Unknown time  Yes Yes   Sig: Take 1 chew tab by mouth   mirtazapine (REMERON) 15 MG tablet 2020 at Unknown time  Yes Yes   Sig: Take 15 mg by mouth At Bedtime   senna-docusate (SENOKOT-S;PERICOLACE) 8.6-50 MG per tablet 2020 at Unknown time  Yes Yes   Sig: Take 2 tablets by mouth daily as needed for constipation   tamsulosin (FLOMAX) 0.4 MG 24 hr capsule 2020 at Unknown time  Yes Yes   Sig: Take 0.4 mg by mouth daily   traZODone (DESYREL) 50 MG tablet 2020 at Unknown time  Yes Yes   Sig: Take 50 mg by mouth At Bedtime      Facility-Administered Medications: None     Allergies   No Known Allergies    Physical Exam   Vital Signs: Temp: 97.7  F (36.5  C) Temp src: Tympanic BP: (!) 143/123 Pulse: 63   Resp: 24 SpO2: 96 % O2 Device: None (Room air)    Weight: 0 lbs 0 oz    Reviewed with ER Provider,  EHR reviewed; patient seen in ER room 4 with his brother present     Vital signs:  Temp: 97.7  F (36.5  C) Temp src: Tympanic BP: 131/93 Pulse: 63 Heart Rate: 65 Resp: 22 SpO2: 95 % O2 Device: None (Room air)        Estimated body mass index is 27.8 kg/m  as calculated from the following:    Height as of 3/10/18: 1.829 m (6').    Weight as of 3/10/18: 93 kg (205 lb).      General: No distress, interactive  Head: normocephalic, no obvious trauma  Eyes: Gaze directed normally, sclera clear, no discharge, no abnormal ocular movements  Ears: Normal appearing age-related external ears, no discharge, stable hearing acuity loss  Nose: Normal age-related appearance  Mouth: Normal appearing oral mucosa, Gums and throat appear age-related normal  Neck: Normal age-related appearance, age-related range of motion, supple, no adenopathy  Pulmonary: Normal work of breathing, no expiratory delay, no coarseness, no wheezing  Cardiovascular: Distant heart sounds, regular rhythm   Abdomen: No obvious distention, soft, bowel sounds present with normal frequency and pitch. Dialysis access looks clean and normal  Rectal: Deferred  Back: Age-related normal  Skin: Age-related normal, no rashes  Extremities: Not tender, no lower extremity edema. Moving upper and lower extremities  Neurological: Grossly in tact  Psychiatric: Mood waxes and wanes from normal to sad as the HPI is discussed, but these swings appear to be a normal response to the situation          Data   Data reviewed today: I reviewed all medications, new labs and imaging results over the last 24 hours

## 2020-01-28 NOTE — PLAN OF CARE
"Reason for hospital stay:  Weakness  Most recent vitals: /97   Pulse 79   Temp 96.9  F (36.1  C) (Tympanic)   Resp 20   Ht 1.854 m (6' 1\")   Wt 77.2 kg (170 lb 3.1 oz)   SpO2 92%   BMI 22.45 kg/m      Pain Management:  fentanyl patch placed on upper left shoulder, pt received PRN ultram to manage back pain, pt reported improvement.   LOC:  A&O, speech is difficult to understand pt speech due to hx of CVA  Cardiac:  WNL  Respiratory:  lungs diminished throughout  GI:  Bowels active and audible is in all four quadrants, multiple loose stools this shift  :  WNL  Skin Issues:  Scattered bruising and scabs throughout body including scabbed area to chin, pt has Peritoneal dialysis devise to abdomen    IVF:  none  ABX:  none    Nutrition: CC diet with no caffeine  ADL's:  Assist of one  Ambulation:assist of one  Safety:  call light within reach, frequent rounding, bed alarm on.     Comments: Pt refused to wear gait belt at times, pt tearful at times.       1/28/2020  8:03 AM  Clinton Schultz RN    Face to face report given with opportunity to observe patient.    Report given to Scarlet Schultz RN   1/28/2020  8:16 AM      "

## 2020-01-28 NOTE — PLAN OF CARE
Discharge Planner OT   Patient plan for discharge: Nursing home  Current status: Transfer Skills: Nikia sit<>stand from chair  Toilet Transfer: Nikia on/off the toilet using the grab bar on the L  Lower Body Dressing: MaxA to doff/don socks  Grooming: CGA standing at the sink  Barriers to return to prior living situation: Fall risk, weakness, R UE ROM deficits, decreased activity tolerance, living at home alone, not enough assistance at home, stairs to enter home  Recommendations for discharge: short term rehab  Rationale for recommendations: Pt exhibits increased deficits in ADLs, which has been progressing over the past 2 weeks. Pt is having difficulties managing at home alone with current assistance, and his brother is having more difficulties assisting him too, as pt states he is 8 years older than him. Pt demonstrates weakness, decreased activity tolerance, deficits with RUE ROM, and impaired balance all affecting pt's safety if her were to return home alone. Pt would benefit from ongoing skilled rehab services at skilled nursing facility to improve his safety and independence in ADLs. Plan to treat during pt's stay.        Entered by: Katie Heart 01/28/2020 12:06 PM

## 2020-01-28 NOTE — PLAN OF CARE
Vitals: VSS.    Pain: Pain rated 5/10 to neck and bilateral shoulders. Pt refused lidocaine patch. Treating pain with fentanyl patch and norco. Pt states the norco works well.     Orientation: A&O. Gargled, slow speech from previous stroke.     Cardiac: Reg.     Lungs: Clear, dim    ABD: Bowels Active. Kayexalate given around 7 am. Many loose stools this AM.  Peritoneal tubing in ABD. Pt aware that he needs to do his treatment himself since we are not trained in this skill. Will let us know if he needs assistance with opening or hanging bags.      Urinating: WNL    Skin: Bruising, scabs, scratches throughout body. Pt scratched open a dime sized scab causing a wound to the back, base of neck. Placed foam dressing.     IV fluids: Refuses IV.    Antibiotics: None    Mobility: Weakness to right side. PT and OT evals done today. Up with stand by assistance.     Safety: Bed/chair alarm on. Call light in reach. Rounding done.    Comment: Brother in to visit. Pt would like placement instead of going home. SIL. is working on this currently.     Face to face report given with opportunity to observe patient.    Report given to Jasmine Wheeler RN   1/28/2020  3:17 PM

## 2020-01-28 NOTE — ED TRIAGE NOTES
Generalized weakness, muscle uncoordnination. Was unable to make sandwich. States has been drooling for one week which is new for him.

## 2020-01-28 NOTE — PLAN OF CARE
Discharge Planner PT   Patient plan for discharge: nursing home  Current status: sit<>stand min A, ambulated 40' with SEC min-mod A with frequent standing rest breaks and occasional catching of R LE with swing through   Barriers to return to prior living situation: fall risk, requires assist for safe mobility, stairs to enter home  Recommendations for discharge: short term rehab  Rationale for recommendations: Pt reports he is no longer able to safely care for himself at home, pt very emotional and tearful throughout conversation. Pt currently demonstrates impaired strength R>L in addition to decreased activity tolerance with at least 2 recent falls at home.  Pt would benefit from short term rehab to improve safety and independence with functional mobility. Pt would benefit from skilled PT to improve strength, safety, and independence with functional mobility.       Entered by: Tiny Arguello, PT 01/28/2020 1:18 PM

## 2020-01-28 NOTE — PLAN OF CARE
Med update from family. Pt takes coreg 12.5mg daily. (Pt had 25mg tablet that he was using. Cut in half until ran out. Now has the 12.5mg tablet.) tums is 1000mg bid.   [facility] place # [phone]. Ask to speak to Sara the nurse there.

## 2020-01-28 NOTE — PROGRESS NOTES
Inpatient Occupational Therapy Evaluation    Name: Stuart Stewart MRN# [id number]   Age: 64 year old YOB: 1955     Date of Consultation: 2020  Consultation is requested by:  Dr. Darden  Specific Consultation Request:  Self-care deficits/confusion; discharge recommendations and therapy to facilitate a safe discharge   Primary care provider: Liam Hallman    General Information:   Onset Date: 2020    History of Current Problem/Admission: Hyperkalemia [E87.5]  Generalized muscle weakness [M62.81]  Failure to thrive (0-17) [R62.51]    Prior Level of Function: Pt very tearful when discussing PLOF. About 2 weeks ago, pt was managing independently with an aide coming in 2x/week to assist with dressing and bathing. Pt's brother assisted pt with dressing the other days, but it has become too hard for him to assist pt. Pt was independent with toileting and functional mobility. He reports furniture walking.   Ambulation: 0 - Independent   Transferrin - Independent   Toiletin - Independent    Bathing: 3 - Assistive Equipment & Person  Dressin - Assistive Person   Eatin - Independent   Communication: 2 - difficulity speaking (not related to language barrier) Pt has expressive aphasia   Swallowin - swallows foods/liquids without difficulty  Cognition: 0 - no cognitive issues reported    Fall history within the last 6 months: Yes - 2     Current Living Situation: Pt lives alone in a house with 3-4 steps to enter from the back with a railing on the left. Bed/bathroom on the main floor. Pt's laundry and furnace/filter is in the basement, but pt's aide does the laundry. Bathroom has a walk in shower with grab bars and a chair and a high toilet with the sink on the left.     Current Equipment Used at Home: grab bars in walk in shower, shower chair, dialysis equipment     Patient & Family Goals: Pt wishes he could do more for himself. Very upset about his situation. In regards  to discharge, he knows he needs more assistance and is interested in going to a nursing home     Past Medical History:   History reviewed. No pertinent past medical history.    Past Surgical History:  History reviewed. No pertinent surgical history.    Medications:   Current Facility-Administered Medications   Medication     acetaminophen (TYLENOL) Suppository 650 mg     acetaminophen (TYLENOL) tablet 650 mg     amLODIPine (NORVASC) tablet 10 mg     aspirin (ASA) chewable tablet 81 mg     calcitRIOL (ROCALTROL) capsule 0.25 mcg     calcium carbonate (TUMS) chewable tablet 500 mg     carvedilol (COREG) tablet 25 mg     cinacalcet (SENSIPAR) tablet 30 mg     diclofenac (VOLTAREN) 1 % topical gel 2 g     DULoxetine (CYMBALTA) DR capsule 20 mg     fentaNYL (DURAGESIC) 12 mcg/hr 72 hr patch 1 patch     fentaNYL (DURAGESIC) Patch in Place     finasteride (PROSCAR) tablet 5 mg     gabapentin (NEURONTIN) capsule 100 mg     gentamicin (GARAMYCIN) 0.1 % cream     heparin ANTICOAGULANT injection 5,000 Units     hydrALAZINE (APRESOLINE) injection 2 mg     HYDROcodone-acetaminophen (NORCO) 5-325 MG per tablet 1 tablet     melatonin tablet 1 mg     mirtazapine (REMERON) tablet 15 mg     multivitamin w/minerals (THERA-VIT-M) tablet 1 tablet     naloxone (NARCAN) injection 0.1-0.4 mg     neomycin-bacitracin-polymyxin (NEOSPORIN) ointment     nicotine (NICODERM CQ) 21 MG/24HR 24 hr patch 1 patch     nicotine Patch in Place     ondansetron (ZOFRAN-ODT) ODT tab 4 mg    Or     ondansetron (ZOFRAN) injection 4 mg     senna-docusate (SENOKOT-S/PERICOLACE) 8.6-50 MG per tablet 2 tablet     simvastatin (ZOCOR) tablet 20 mg     tamsulosin (FLOMAX) capsule 0.4 mg     traZODone (DESYREL) tablet 50 mg       Weight Bearing Status: FWB     Cognitive Status Examination:  Orientation: oriented to time, place and person  Level of Consciousness: alert  Follows Commands and Answers Questions: 75% of the time  Personal Safety and Judgement: Impaired    Memory: Immediate recall intact and Long-term memory intact    Pain:   Currently in pain? Yes  Pain Location? neck  Pain Ratin-5 (just had a pill)     Occupational Therapy Evaluation:   Integumentary/Edema: not assessed   Range of Motion: LUE WFL, RUE impaired due to CVA - pt uses his LUE to move his RUE often    Strength: L shoulder grossly 4-/5, L elbow grossly 4/5  Hand Strength: L gross grasp good, R gross grasp fair    Mobility:   Transfer Skills: Nikia sit<>stand from chair  Toilet Transfer: Nikia on/off the toilet using the grab bar on the L  Balance: Poor without assistive device. Fair with cane    ADLs:   Lower Body Dressing: MaxA to doff/don socks  Grooming: CGA standing at the sink    IADLs:   Previous Responsibilities of the Patient: Meal Prep, Shopping and Driving   Comments: Pt's brother sets up his medications and completes his finances. Pt's aides complete laundry and housekeeping. He has assistance for yard work.      Activities of Daily Living Analysis:   Impairments Contributing to Impaired Activities of Daily Living: Balance impaired , RUE ROM decreased , strength decreased and activity tolerance decreased    Occupational Therapy Interventions: ADL Retraining , IADL retraining, ROM , strengthening , progressive activity/exercise and risk factor education     Clinical Impressions:  Criteria for Skilled Therapeutic Intervention Met: Yes, treatment indicated  OT Diagnosis: Impaired ADLs  Influenced by the following impairments: Impaired balance, weakness, deficits with RUE ROM, weakness, and decreased activity tolerance  Functional limitations due to impairment: Dressing, grooming, bathing, toileting, functional mobility  Clinical presentation: Evolving/Changing  Clinical presentation rationale: Clinical judgement   Clinical Decision making (complexity): Low Complexity  Frequency: 5 times/week  Predicted Duration of Therapy Intervention (days/wks): 5 days  Anticipated Discharge Disposition:  Transitional Care Facility   Anticipated Equipment Needs at Discharge: TBD  Risks and Benefits of therapy have been explained: Yes  Patient, Family & other staff in agreement with plan of care: Yes  Clinical Impression Comments: Pt exhibits increased deficits in ADLs, which has been progressing over the past 2 weeks. Pt is having difficulties managing at home alone with current assistance, and his brother is having more difficulties assisting him too, as pt states he is 8 years older than him. Pt demonstrates weakness, decreased activity tolerance, deficits with RUE ROM, and impaired balance all affecting pt's safety if her were to return home alone. Pt would benefit from ongoing skilled rehab services at skilled nursing facility to improve his safety and independence in ADLs. Plan to treat during pt's stay.     Total Eval Time: 20

## 2020-01-28 NOTE — PLAN OF CARE
[facility] Inpatient Admission Note:    Patient admitted to 3230/3230-1 at approximately 8:40 PM via wheel chair accompanied by transport tech from emergency room . Report received from Casandra ARELLANO in SBAR format at 8:20 PM via telephone. Patient ambulated to bed via self.. Patient is alert and oriented X 3, reports pain; rates at 8 on 0-10 scale.  Patient oriented to room, unit, hourly rounding, and plan of care. Explained admission packet and patient handbook with patient bill of rights brochure. Will continue to monitor and document as needed.     Inpatient Nursing criteria listed below was met:    Health care directives status obtained and documented: Yes    Care Everywhere authorization obtained No    MRSA swab completed for patient 65 years and older: Yes    Patient identifies a surrogate decision maker: Yes If yes, who: brother Joe Stewart Contact Information: [phone]     If initial lactic acid >2.0, repeat lactic acid drawn within one hour of arrival to unit: NA    Vaccination assessment and education completed: Yes   Vaccinations received prior to admission: Pneumovax yes  Influenza(seasonal)  YES   Vaccination(s) ordered: not given today because patient is up to date    Clergy visit ordered if patient requests: No - patient declined    Skin issues/needs documented: Yes    Isolation Patient: no Education given, correct sign in place and documentation row added to PCS:  No    Fall Prevention Yes: Care plan updated, education given and documented, sticker and magnet in place: Yes    Care Plan initiated: Yes    Education Documented (including assessment): Yes    Patient has discharge needs : Yes If yes, please explain: unable to care for self at home

## 2020-01-28 NOTE — ED PROVIDER NOTES
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 The patient grew tired of waiting and left AMA, but returns today with worsening weakness. He has been too weak to make food for himself. He feels that he can no longer live independently and hopes to get help transitioning to an assisted living facility. No fevers/chills, chest pain/shortness of breath or new asymmetrical weakness/dizziness/gait instability.    Allergies:  No Known Allergies    Problem List:    Patient Active Problem List    Diagnosis Date Noted     Weakness 01/27/2020     Priority: Medium        Past Medical History:    History reviewed. No pertinent past medical history.    Past Surgical History:    History reviewed. No pertinent surgical history.    Family History:    No family history on file.    Social History:  Marital Status:   [4]  Social History     Tobacco Use     Smoking status: Current Every Day Smoker     Packs/day: 2.00     Smokeless tobacco: Never Used   Substance Use Topics     Alcohol use: No     Drug use: No        Medications:    No current outpatient medications on file.        Review of Systems   Constitutional: Negative for fever.   HENT: Negative for congestion.    Eyes: Negative for redness.   Respiratory: Negative for wheezing.    Cardiovascular: Negative for palpitations.   Gastrointestinal: Negative for abdominal pain.   Genitourinary: Negative for difficulty urinating.   Musculoskeletal: Negative for arthralgias and neck stiffness.   Skin: Negative for color change.   Neurological: Negative for headaches.   Psychiatric/Behavioral: Negative for confusion.       Physical Exam   BP: (!) 143/123  Pulse: 63  Heart  "Rate: 65  Temp: 97.7  F (36.5  C)  Resp: 20  Height: 185.4 cm (6' 1\")  Weight: 77.2 kg (170 lb 3.1 oz)  SpO2: 96 %      Physical Exam    General Appearance: well-developed, frail, appears weak, alert & oriented, no apparent distress.    HEENT: atraumatic. Pupils equal, round & reactive to light, extraocular movements intact. Bilateral ear canals clear. Nose without rhinorrhea or epistaxis. Clear oropharynx. Moist mucous membranes.    Neck: normal inspection, non-tender, full & painless ROM, supple, no lymphadenopathy or nuchal rigidity. No jugular venous distension.    Cardiovascular: regular rate, rhythm, normal S1 & S2, no murmurs, rubs or gallops.    Respiratory: clear lung sounds with good air entry, no wheezes rales or rhonchi, no acute respiratory distress.    Gastrointestinal: normal inspection, normal bowel sounds, non-tender, no masses or organomegaly.    Extremities: normal inspection, 2+/4+ pulses bilaterally, normal & painless ROM, non-tender, joints normal.    Neurologic: CN II - XII intact, no motor/sensory deficit.    Skin: normal color, no skin rash.    ED Course     ED Course as of Jan 28 0611   Mon Jan 27, 2020   1855 Hemoglobin(!): 9.6     Procedures           Results for orders placed or performed during the hospital encounter of 01/27/20 (from the past 24 hour(s))   CBC with platelets differential   Result Value Ref Range    WBC 7.0 4.0 - 11.0 10e9/L    RBC Count 2.97 (L) 4.4 - 5.9 10e12/L    Hemoglobin 9.6 (L) 13.3 - 17.7 g/dL    Hematocrit 29.5 (L) 40.0 - 53.0 %    MCV 99 78 - 100 fl    MCH 32.3 26.5 - 33.0 pg    MCHC 32.5 31.5 - 36.5 g/dL    RDW 14.2 10.0 - 15.0 %    Platelet Count 201 150 - 450 10e9/L    Diff Method Automated Method     % Neutrophils 75.9 %    % Lymphocytes 14.2 %    % Monocytes 5.0 %    % Eosinophils 3.6 %    % Basophils 1.0 %    % Immature Granulocytes 0.3 %    Nucleated RBCs 0 0 /100    Absolute Neutrophil 5.3 1.6 - 8.3 10e9/L    Absolute Lymphocytes 1.0 0.8 - 5.3 10e9/L "    Absolute Monocytes 0.4 0.0 - 1.3 10e9/L    Absolute Eosinophils 0.3 0.0 - 0.7 10e9/L    Absolute Basophils 0.1 0.0 - 0.2 10e9/L    Abs Immature Granulocytes 0.0 0 - 0.4 10e9/L    Absolute Nucleated RBC 0.0    Comprehensive metabolic panel   Result Value Ref Range    Sodium 134 133 - 144 mmol/L    Potassium 6.3 (HH) 3.4 - 5.3 mmol/L    Chloride 101 94 - 109 mmol/L    Carbon Dioxide 21 20 - 32 mmol/L    Anion Gap 12 3 - 14 mmol/L    Glucose 85 70 - 99 mg/dL    Urea Nitrogen 99 (H) 7 - 30 mg/dL    Creatinine 10.50 (H) 0.66 - 1.25 mg/dL    GFR Estimate 5 (L) >60 mL/min/[1.73_m2]    GFR Estimate If Black 5 (L) >60 mL/min/[1.73_m2]    Calcium 8.2 (L) 8.5 - 10.1 mg/dL    Bilirubin Total 0.4 0.2 - 1.3 mg/dL    Albumin 3.2 (L) 3.4 - 5.0 g/dL    Protein Total 6.3 (L) 6.8 - 8.8 g/dL    Alkaline Phosphatase 104 40 - 150 U/L    ALT 29 0 - 70 U/L    AST 11 0 - 45 U/L   Lactic acid whole blood   Result Value Ref Range    Lactic Acid 0.4 (L) 0.7 - 2.0 mmol/L   Troponin I   Result Value Ref Range    Troponin I ES <0.015 0.000 - 0.045 ug/L   XR Chest Port 1 View    Narrative    PROCEDURE:  XR CHEST PORT 1 VW    HISTORY: cough. .    COMPARISON:  None.    FINDINGS:    The cardiomediastinal contours are magnified by portable technique.  There is calcific aortic atherosclerosis.   Right hemidiaphragm is slightly elevated. There are mild basilar  opacities.      Impression    IMPRESSION:  Streaky bibasilar opacities favor atelectasis or  scarring. Consider follow-up.      CITLALY DUMONT MD   UA reflex to Microscopic and Culture   Result Value Ref Range    Color Urine Light Yellow     Appearance Urine Clear     Glucose Urine 70 (A) NEG^Negative mg/dL    Bilirubin Urine Negative NEG^Negative    Ketones Urine Negative NEG^Negative mg/dL    Specific Gravity Urine 1.011 1.003 - 1.035    Blood Urine Trace (A) NEG^Negative    pH Urine 6.5 4.7 - 8.0 pH    Protein Albumin Urine 30 (A) NEG^Negative mg/dL    Urobilinogen mg/dL Normal 0.0 -  2.0 mg/dL    Nitrite Urine Negative NEG^Negative    Leukocyte Esterase Urine Negative NEG^Negative    Source Midstream Urine     RBC Urine <1 0 - 2 /HPF    WBC Urine <1 0 - 5 /HPF    Bacteria Urine None (A) NEG^Negative /HPF   Basic metabolic panel   Result Value Ref Range    Sodium 137 133 - 144 mmol/L    Potassium 5.5 (H) 3.4 - 5.3 mmol/L    Chloride 103 94 - 109 mmol/L    Carbon Dioxide 22 20 - 32 mmol/L    Anion Gap 12 3 - 14 mmol/L    Glucose 115 (H) 70 - 99 mg/dL    Urea Nitrogen 95 (H) 7 - 30 mg/dL    Creatinine 10.60 (H) 0.66 - 1.25 mg/dL    GFR Estimate 5 (L) >60 mL/min/[1.73_m2]    GFR Estimate If Black 5 (L) >60 mL/min/[1.73_m2]    Calcium 8.6 8.5 - 10.1 mg/dL       Medications   amLODIPine (NORVASC) tablet 10 mg (has no administration in time range)   aspirin (ASA) chewable tablet 81 mg (has no administration in time range)   multivitamin w/minerals (THERA-VIT-M) tablet 1 tablet (has no administration in time range)   calcium carbonate (TUMS) chewable tablet 500 mg (has no administration in time range)   carvedilol (COREG) tablet 25 mg (25 mg Oral Not Given 1/27/20 2130)   finasteride (PROSCAR) tablet 5 mg (has no administration in time range)   mirtazapine (REMERON) tablet 15 mg (15 mg Oral Given 1/27/20 2107)   senna-docusate (SENOKOT-S/PERICOLACE) 8.6-50 MG per tablet 2 tablet (has no administration in time range)   simvastatin (ZOCOR) tablet 20 mg (20 mg Oral Given 1/27/20 2107)   tamsulosin (FLOMAX) capsule 0.4 mg (has no administration in time range)   traZODone (DESYREL) tablet 50 mg (50 mg Oral Given 1/27/20 2107)   acetaminophen (TYLENOL) tablet 650 mg (650 mg Oral Given 1/27/20 2107)   acetaminophen (TYLENOL) Suppository 650 mg (has no administration in time range)   naloxone (NARCAN) injection 0.1-0.4 mg (has no administration in time range)   melatonin tablet 1 mg (has no administration in time range)   ondansetron (ZOFRAN-ODT) ODT tab 4 mg (has no administration in time range)     Or    ondansetron (ZOFRAN) injection 4 mg (has no administration in time range)   nicotine Patch in Place ( Transdermal Patch in Place 1/28/20 0543)   nicotine (NICODERM CQ) 21 MG/24HR 24 hr patch 1 patch (1 patch Transdermal Patch/Med Applied 1/27/20 2152)   diclofenac (VOLTAREN) 1 % topical gel 2 g (2 g Transdermal Not Given 1/27/20 2100)   hydrALAZINE (APRESOLINE) injection 2 mg (has no administration in time range)   fentaNYL (DURAGESIC) Patch in Place ( Transdermal Patch in Place 1/28/20 0239)   fentaNYL (DURAGESIC) 12 mcg/hr 72 hr patch 1 patch (1 patch Transdermal Patch/Med Applied 1/27/20 2343)   traMADol (ULTRAM) tablet 50 mg (50 mg Oral Given 1/28/20 0241)   sodium polystyrene (KAYEXALATE) suspension 15 g (15 g Oral Given 1/27/20 1949)       Assessments & Plan (with Medical Decision Making)   The patient is a 64 year old man with generalized weakness/failure to thrive and hyperkalemia.    1) Generalized weakness/Failure to thrive - likely multifactorial. Stable anemia. No leukocytosis. Hyperkalemia with otherwise stable renal failure. No acute hepatic abnormality. Normal lactic acid. Negative troponin and no acute ST changes or peaked T waves on EKG. No evidence of acute cystitis on UA. Streaky bibasilar opacities consistent with atelectasis on CXR. Hyperkalemia treated with Kayexalate. Patient discussed with Dr. Darden who will admit the patient for further evaluation and treatment.    I have reviewed the nursing notes.    I have reviewed the findings, diagnosis, plan and need for follow up with the patient.    Current Discharge Medication List          Final diagnoses:   Generalized muscle weakness   Failure to thrive (0-17)   Hyperkalemia       1/27/2020   [facility]     Kian Weeks MD  01/28/20 0606

## 2020-01-28 NOTE — PLAN OF CARE
Prior to Admission Medication Reconciliation:     Medications added:   [] None  [x] As listed below:    Heparin- per [facility], filled 1/10/2020 (4 vials)    Medications deleted:   [x] None  [] As listed below:    Changes made to existing medications:   [x] None  [] As listed below:    Last times/dates taken verified with patient:  [] Yes- completed myself  [x] Nurse completed no changes made  [] Unable to review with patient:  [] Nurse completed/changes made:     Allergy modifications:    [x]Did not review  []Patient verified NKA  []Patient verified current existing allergies: no changes made  []New allergies: listed below    Medication reconciliation sources:   []Patient  [x]Patient family member/emergency contact: Joe Stewart- phone call and handwritten medlist  [x][facility] Report Review:  Home Medications   - Last Reconciled 01/09/20 by Tanesha Reddy, [facility]    amlodipine 10 mg tablet    10 mg PO DAILY     aspirin 81 mg tablet,delayed release (Adult Low Dose Aspirin)    81 mg PO DAILY     calcitriol 0.25 mcg capsule    0.25 mcg PO DAILY     calcium carbonate 300 mg (750 mg) chewable tablet (Tums)    300 mg PO BID     carvedilol 25 mg tablet    25 mg PO AM     cinacalcet 30 mg tablet (Sensipar)    30 mg PO DAILY     finasteride 5 mg tablet    5 mg PO DAILY     lisinopril 40 mg tablet    40 mg PO DAILY     mirtazapine 15 mg tablet    15 mg PO BEDTIME     multivit-mins no.11-folic acid 5 mg tablet (Dialyvite 5000)    5 mg PO DAILY     simvastatin 20 mg tablet    20 mg PO BEDTIME     tamsulosin 0.4 mg capsule    0.4 mg PO BEDTIME     trazodone 50 mg tablet    50 mg PO BEDTIME PRN   []Epic Chart Review  []Care Everywhere review  []Pharmacy med list: **  [x]Pharmacy phone call: [facility] for Calcitriol and Sensipar [phone]  [x]Outside meds dispense report: [facility]  []Nursing home MAR:  []Other: **    Is patient on coumadin?  [x]No    Time spent on medication reconciliation:    []5-20 mins  [x]20-40 mins  []> 40 mins    Discrepancies: [x] No  []Yes: listed below    Requests for consultation by provider or pharmacist:   [x] Patient understands why all of their meds were prescribed and how to take them. No questions.   [] Fill dates coincide with compliancy for all maintenance meds.   [x] Fill dates do not coincide with compliancy with maintenance meds. See comments.  [] Patient has questions about the following:    Comments: Joe Stewart helps patient manage his meds.  There is needed clarification on whether his coreg is 12.5 mg or 25 mg. 12.5 mg were last filled on 8/15/19 by [facility]. His [facility] records state 25 mg daily.     Noted in . [facility] report on 1/9 Dr. Hallman stopped his lisinopril because the pt had low bp and thought the lisinopril could be causing his elevated potassium levels.     [facility] fills his kidney dialysis meds. The last time he got his sensipar filled was September 9th 2019 for a 30 ds. Brother has on med list that he has been taking this daily but I do not know how compliant he has been.     Romy Bell on 1/28/2020 at 11:34 AM     Issues completing PTA medication reconciliation:  [] On hold for a long time  [] Waited for a call back  [] Fax didn't come through  [] Fax took a long time  [] Other:

## 2020-01-28 NOTE — PROGRESS NOTES
Inpatient Physical Therapy Evaluation    Name: Stuart Stewart MRN# [id number]   Age: 64 year old YOB: 1955     Date of Consultation: 2020  Consultation is requested by:  Dr. Darden  Specific Consultation Request:  Discharge recommendations  Primary care provider: Liam Hallman    General Information:   Onset Date: 20    History of Current Problem/Admission: Hyperkalemia [E87.5]  Generalized muscle weakness [M62.81]  Failure to thrive (0-17) [R62.51]    Prior Level of Function: Pt reports he ambulates around his home without assistive device although does have a FWW and SEC available if needed.  Pt states he drives.  Pt gets assist 2x/week with bathing and dressing from home health, they also assist with laundry.  Pt states his brother has to come help him with dressing on other days but his brother works.  Pt states he is independent with toileting.  Pt is tearful multiple times throughout assessment.    Ambulation: 0 - Independent   Transferrin - Independent   Toiletin - Independent    Bathin - Assistive Person   Dressin - Assistive Person  Eatin - Independent   Communication: 2 - difficulity speaking (not related to language barrier)  Swallowin - swallows foods/liquids without difficulty  Cognition: 0 - no cognitive issues reported    Fall history within the last 6 months: Yes, at least 2 recently    Current Living Situation: Patient reports he lives alone in a house, has 3-4 steps with rail on left to enter his home.  Pt states bedroom and bathroom are located on main floor of home.  Pt's furnace and laundry are in the basement.    Current Equipment Used at Home: none but has FWW and SEC     Patient & Family Goals: to go to nursing home, states he is not longer able to care for himself     Past Medical History:   History reviewed. No pertinent past medical history.    Past Surgical History:  History reviewed. No pertinent surgical  history.    Medications:   Current Facility-Administered Medications   Medication     acetaminophen (TYLENOL) Suppository 650 mg     acetaminophen (TYLENOL) tablet 650 mg     amLODIPine (NORVASC) tablet 10 mg     aspirin (ASA) chewable tablet 81 mg     calcitRIOL (ROCALTROL) capsule 0.25 mcg     calcium carbonate (TUMS) chewable tablet 500 mg     carvedilol (COREG) tablet 25 mg     cinacalcet (SENSIPAR) tablet 30 mg     diclofenac (VOLTAREN) 1 % topical gel 2 g     DULoxetine (CYMBALTA) DR capsule 20 mg     fentaNYL (DURAGESIC) 12 mcg/hr 72 hr patch 1 patch     fentaNYL (DURAGESIC) Patch in Place     finasteride (PROSCAR) tablet 5 mg     gabapentin (NEURONTIN) capsule 100 mg     gentamicin (GARAMYCIN) 0.1 % cream     heparin ANTICOAGULANT injection 5,000 Units     hydrALAZINE (APRESOLINE) injection 2 mg     HYDROcodone-acetaminophen (NORCO) 5-325 MG per tablet 1 tablet     melatonin tablet 1 mg     mirtazapine (REMERON) tablet 15 mg     multivitamin w/minerals (THERA-VIT-M) tablet 1 tablet     naloxone (NARCAN) injection 0.1-0.4 mg     neomycin-bacitracin-polymyxin (NEOSPORIN) ointment     nicotine (NICODERM CQ) 21 MG/24HR 24 hr patch 1 patch     nicotine Patch in Place     ondansetron (ZOFRAN-ODT) ODT tab 4 mg    Or     ondansetron (ZOFRAN) injection 4 mg     senna-docusate (SENOKOT-S/PERICOLACE) 8.6-50 MG per tablet 2 tablet     simvastatin (ZOCOR) tablet 20 mg     tamsulosin (FLOMAX) capsule 0.4 mg     traZODone (DESYREL) tablet 50 mg       Weight Bearing Status: FWB bilateral LEs     Cognitive Status Examination:  Orientation: oriented to time, place and person  Level of Consciousness: alert  Follows Commands and Answers Questions: 75% of the time  Personal Safety and Judgement: Impaired  Memory: Short-term memory intact  Comments: Pt does have expressive aphasia but is able to get words and thoughts out with time and effort, at times difficult to understand and needs to be asked to repeat himself.      Pain:    Currently in pain? Yes  Pain Location? neck  Pain Ratin    Physical Therapy Evaluation:   Integumentary/Edema: LE abrasions  ROM: LE AROM WFL  Strength: L LE strength 4+/5, R LE strength grossly 4-/5 throughout  Bed Mobility: NT, up in chair  Transfers: sit<>stand min A  Gait: ambulated 40' with SEC min-mod A at times, occasionally catches R foot with swing through, stands frequently for brief rest breaks due to fatigue.  Stairs: NT  Balance: fair - with support  Coordination: NT    Physical Therapy Interventions: Balance, Bed Mobility, Gait Training , Neuro-muscular re-education, Strengthening, Transfer Training, Risk Factor Education and Progressive Activity/Exercise     Clinical Impressions:  Criteria for Skilled Therapeutic Intervention Met: Yes, treatment indicated  PT Diagnosis: gait disturbance with impaired safety  Influenced by the following impairments: decreased strength, decreased balance, decreased activity tolerance  Functional limitations due to impairment: decreased tolerance and safety for functional mobility and ADLs   Clinical presentation: Evolving/Changing  Clinical presentation rationale: clinical judgement  Clinical Decision making (complexity): Moderate Complexity  Frequency: 6 times/week  Predicted Duration of Therapy Intervention (days/wks): 5 days  Anticipated Discharge Disposition: Transitional Care Facility   Anticipated Equipment Needs at Discharge:   Risks and Benefits of therapy have been explained: Yes  Patient, Family & other staff in agreement with plan of care: Yes  Clinical Impression Comments: Pt reports he is no longer able to safely care for himself at home, pt very emotional and tearful throughout conversation. Pt currently demonstrates impaired strength R>L in addition to decreased activity tolerance with at least 2 recent falls at home.  Pt would benefit from short term rehab to improve safety and independence with functional mobility.     Total Eval Time:  20

## 2020-01-28 NOTE — PLAN OF CARE
Reason for hospital stay:  Hyperkalemia, Inability to care for self, chronic pain    Living situation PTA: Home     Pain Management:  Complains of severe right shoulder pain and bilateral neck pain. Gave PO dilaudid and PO tylenol per orders with effective pain management. Assisted with repositioning in bed with extra pillows for comfort.     LOC:  A+O x4 - restless and agitated, easily upset, tearful at times, uncooperative. Demanding pain meds as soon as arrived in room and unwilling to cooperate with assessment until pain meds were given. Refused SCD's, refused telemetry, refused IV, was refusing bed alarms but did eventually allow alarms after discussion. Speech slow and garbled from history of CVA 2 years ago. Uses call light at times and makes needs known.      Cardiac:  WDL    Respiratory:  Lungs clear but diminished throughout.     GI:  WDL - Having loose stools from kayexalate. Good appetite - ate 2 box lunches for supper. Taking in adequate fluids.     : Denies any issues voiding    Skin Issues:  Scattered bruising and scabbing - scratch marks on shoulders and back from itching.     IVF:  No IV access - refuses    ABX:  None    Activity: Extensive right sided weakness from past CVA. Did agree to try walker for ambulation. Ambulated into bathroom with gait belt and walker with unsteady and weak gait - required assist of 1 staff.     Safety:  Alarms on    Comments: Brother Joe brought in Peritoneal dialysis equipment - patient has order for use of home dialysis equipment and brother states patient does know how to use equipment independently.    Patients home nicotine patches  - patient did have his own patch on abdomen upon arrival to room -  patch removed and new patch from hospital placed on right arm.       Face to face report given with opportunity to observe patient.    Report given to Clinton Rodriguez RN   2020  11:28 PM

## 2020-01-29 NOTE — PROGRESS NOTES
[facility]    Hospitalist Progress Note      Assessment & Plan   Stuart Stewart is a 64 year old male who was admitted on 1/27/2020.  He has history of hypertension, hyperlipidemia, end-stage renal disease on peritoneal dialysis, history of CVA with right-sided hemiparesis and expressive dysphasia presented to the ED with inability to take care of himself at home.  His issues are the following:    Hyperkalemia: Resolved now.  Due to end-stage renal disease.  The patient probably has not been able to dialyze himself optimally.   Continue to monitor for now.    End-stage renal disease: Spoke with Dr. Bar who is his nephrologist.  The patient has not been getting dialyzed optimally.  He would like him to be transferred to [facility].  Today did not have any beds but in the morning they should be able to transfer him over there.  No emergent need for dialysis tonight.  The patient is on peritoneal dialysis.  He manages it himself but is unable to do so anymore..  He has equipment with him.  He is a candidate for hemodialysis but refuses to get hemodialyzed.      Hypertension: Continue home medications.  He is on Coreg and amlodipine.    Chronic pain issues: The patient is on fentanyl patch  and tramadol for breakthrough pain.    Depression: The patient is sad but not suicidal.  Continue on Remeron.  He feels helpless overall.    Tobacco dependence: Continue with nicotine patch    DVT Prophylaxis: Heparin SQ  Code Status: Full code  Disposition: Expected transfer to [facility] in a.m.    Discussed with the patient and the brother in detail.    Gregoria Emmanuel    Interval History   The patient seen and examined.  Overnight events reviewed.  Discussed with the nursing staff.  He refused to get dialyzed last night.  He does have chronic pain issues.  Overall feels sad that he was overwhelmed at home and was not able to take care of himself.  Denies fevers or chills.  Denies nausea or  vomiting.  Overall feels very tired.    -Data reviewed today: I reviewed all new labs and imaging results over the last 24 hours. I personally reviewed no images or EKG's today.    Physical Exam   Temp: 96.5  F (35.8  C) Temp src: Tympanic BP: 98/69   Heart Rate: 57 Resp: 18 SpO2: 92 % O2 Device: None (Room air)    Vitals:    01/27/20 2041   Weight: 77.2 kg (170 lb 3.1 oz)     Vital Signs with Ranges  Temp:  [96.4  F (35.8  C)-97.8  F (36.6  C)] 96.5  F (35.8  C)  Heart Rate:  [57-84] 57  Resp:  [16-20] 18  BP: ()/(69-98) 98/69  SpO2:  [92 %-94 %] 92 %  I/O last 3 completed shifts:  In: 1080 [P.O.:1080]  Out: 1935 [Urine:1235; Stool:700]    Peripheral IV 03/10/18 Right Upper forearm (Active)   Number of days: 690       Peripheral IV 03/10/18 Left Lower forearm (Active)   Number of days: 690       Peripheral IV 01/23/20 Left Upper forearm (Active)   Number of days: 6     Line/device assessment(s) completed for medical necessity    General: The patient has expressive aphasia but is interactive, alert oriented hard to understand due to dysphasia  HEENT: PERRLA  Neck: Supple  Lungs: Bilaterally clear to auscultation  Cardiovascular:S1+S2 [place]  Abdomen: Soft, nontender, bowel sounds present, dialysis catheter in place.  No erythema or discharge  Extremities: No pitting edema, pulses bilaterally palpable, no cyanosis  Neuro: Expressive dysphasia, right-sided hemiparesis  Psych: Depressed but not suicidal     Medications       amLODIPine  10 mg Oral Daily     aspirin  81 mg Oral Daily     calcitRIOL  0.25 mcg Oral At Bedtime     carvedilol  25 mg Oral BID w/meals     DULoxetine  20 mg Oral Daily     fentaNYL  12 mcg Transdermal Q72H     fentaNYL   Transdermal Q8H     finasteride  5 mg Oral Daily     gabapentin  100 mg Oral At Bedtime     gentamicin   Topical Daily     heparin ANTICOAGULANT  5,000 Units Subcutaneous Q12H     lidocaine  2 patch Transdermal Q24H     lidocaine   Transdermal Q8H     mirtazapine  15 mg  Oral At Bedtime     multivitamin w/minerals  1 tablet Oral Daily     nicotine  1 patch Transdermal Daily     nicotine   Transdermal Q8H     simvastatin  20 mg Oral At Bedtime     tamsulosin  0.4 mg Oral Daily     traZODone  50 mg Oral At Bedtime       Data   Recent Labs   Lab 01/29/20  0531 01/28/20  0507 01/27/20  1842  01/23/20  1145   WBC 8.2  --  7.0  --  7.9   HGB 8.9*  --  9.6*  --  9.7*     --  99  --  101*     --  201  --  190    137 134  --  133   POTASSIUM 4.8 5.5* 6.3*   < > 6.3*   CHLORIDE 101 103 101  --  103   CO2 22 22 21  --  18*   BUN 93* 95* 99*  --  100*   CR 10.20* 10.60* 10.50*  --  10.00*   ANIONGAP 12 12 12  --  12   CHELSEA 8.5 8.6 8.2*  --  8.6   GLC 90 115* 85  --  81   ALBUMIN  --   --  3.2*  --  3.2*   PROTTOTAL  --   --  6.3*  --  6.6*   BILITOTAL  --   --  0.4  --  0.4   ALKPHOS  --   --  104  --  104   ALT  --   --  29  --  29   AST  --   --  11  --  8   TROPI  --   --  <0.015  --  <0.015    < > = values in this interval not displayed.       No results found for this or any previous visit (from the past 24 hour(s)).

## 2020-01-29 NOTE — PLAN OF CARE
Here for weakness. Hx of CVA. Pt is A&O w/ VSS. Satting 94% on RA w/ no complaints of SOB or chest pain. Rates pain at a 5-6/10 to neck/shoulder characteristic of a constant aching. PO Norco administered x2 & PO tramadol order by MD and administered x1. Presence of peritoneal dialysis cath to mid lower abd. Remains in place w/ no complications. Optifoam dressing to posterior upper spine remains CDI. Right side remains weak. Pt struggled to sleep tonight dt pain. Finally fell asleep around 0545 following administration of tramadol. Bed in lowest position. Call light in reach. ID band in place. Makes needs known. Will continue to monitor.    Face to face report given with opportunity to observe patient.    Report given to Michelle Lindo   1/29/2020  7:15 AM

## 2020-01-29 NOTE — PLAN OF CARE
Pt alert and oriented x3, tearful and frustrated today. Staff providing reassurance and active listening utilized. VSS, afebrile and reporting no pain this shift. PT here and changed bandages to R shoulder- pt reports improvement of shoulder discomfort. Lidocaine patches applied to back, fentanyl patch remains intact. Sleeping on and off in bed today, poor appetite. HR irreg- MD updated,  lungs with insp/ exp wheezes to LARS and LLL, otherwise clear, encouraging TCDB. Bowel sounds active, abd soft and non- distended. Dressing to abdomen- peritoneal catheter CDI. Pt unable to complete his dialysis treatments, brother here and discussed with MD. Optifoam to posterior neck intact, gauze to lower mid back intact. Pt with multiple scabs and bruises scattered t/o body. Using urinal at bedside.  Independently positioning self in bed. Bed alarms on, call light within reach and free from falls/ injury.     Face to face report given with opportunity to observe patient.    Report given to ADAN Vera RN   1/29/2020  3:31 PM

## 2020-01-29 NOTE — PLAN OF CARE
Staff offered to assist patient with peritoneal dialysis, but stated he didn't feel like doing it today.  Has scratched several scabs off.  Patient states they itch.  Staff reminded him not to scratch them open.    Face to face report given with opportunity to observe patient.    Report given to Michael TOVAR RN   1/29/2020  12:20 AM

## 2020-01-29 NOTE — PROVIDER NOTIFICATION
MD notified in person of pt reporting experiencing extreme shoulder pain not alleviated by current pain control regimen    Tramadol ordered by provider

## 2020-01-29 NOTE — PLAN OF CARE
"Discharge Planner OT   Patient plan for discharge: Pt did not state today. Yesterday, he was interested in going to a nursing home however would have to pay out of pocket (which pt does not want to do)  Current status: Pt supine in bed upon OT arrival. He was agreeable to treatment but seemed very unmotivated/uninterested in doing anything today, compared to yesterday, pt was more weepy/sad. Pt transferred supine to sit with SBA. He required MaxA to don his socks, but needed encouragement to allow clinician to don for safety. Pt transferred into standing with Nikia and was a little more unsteady, so only went to the chair. In sitting, pt was reluctantly agreeable to washing his face with his response \"whatever\". Pt didnt want to brush his teeth and already had clean pants. Pt required MaxA to doff (not attempting himself). Pt requested clinician to shave him. Pt educated that clinician can set up for him but that he'd have to try himself, pt declined. His nursing aide was informed.   Barriers to return to prior living situation: Fall risk/impaired balance, weakness, RUE ROM deficits, living alone, inability to care for himself, stairs to enter home, decreased activity tolerance   Recommendations for discharge: Short term rehab would be most beneficial, however, pt has to pay out of pocket and does not want to. Therefore, at minimum, recommend increased services as well as home OT  Rationale for recommendations: Due to pt's current functional status       Entered by: Katie Heart 01/29/2020 12:22 PM     "

## 2020-01-30 NOTE — PLAN OF CARE
Patient discharged at 9:59 AM via wheel chair accompanied by brother and staff. Prescriptions sent to patients preferred pharmacy. All belongings sent with patient.     Discharge instructions reviewed with patient.     Patient discharged to home-to follow up in [place].   Report called to N/A    Core Measures and Vaccines  Core Measures applicable during stay: No. If yes, state diagnosis: N/A   Pneumonia and Influenza given prior to discharge, if indicated: N/A    Surgical Patient   Surgical Procedures during stay: N/A  Did patient receive discharge instruction on wound care and recognition of infection symptoms? N/A    MISC  Follow up appointment made:  N/A-no follow up appointment ordered. Patient to follow up in [place], brother transporting patient there  Home and hospital aquired medications returned to patient: N/A  Patient reports pain was well managed at discharge: Yes

## 2020-01-30 NOTE — DISCHARGE SUMMARY
[facility]  Hospitalist Discharge Summary       Date of Admission:  1/27/2020  Date of Discharge:  1/30/2020  Discharging Provider: Gregoria Emmanuel MD      Discharge Diagnoses   ESRD on PD  Failure to thrive  Chronic pain    Follow-ups Needed After Discharge   Follow-up Appointments     Follow-up and recommended labs and tests       With [facility] in [place] today             Unresulted Labs Ordered in the Past 30 Days of this Admission     No orders found from 12/28/2019 to 1/28/2020.          Discharge Disposition   Discharged to home to go to ED at [facility]    Condition at discharge: Stable    Hospital Course   Stuart Stewart is a 64 year old male who was admitted on 1/27/2020.  He has history of hypertension, hyperlipidemia, end-stage renal disease on peritoneal dialysis, history of CVA with right-sided hemiparesis and expressive dysphasia presented to the ED with inability to take care of himself at home.  The patient was just not able to dialyze himself.  Here as an inpatient R nursing staff is not trained to do peritoneal dialysis.  I spoke with Dr. Bar who is his nephrologist and he suggested to transfer the patient [facility] but they did not have any beds available.  Ultimately the patient decided to be discharged from here and go to [facility] where he has been before to get his dialysis done.  [facility] in [place] is also on diversion.  The patient is hemodynamically stable and does not need to stay inpatient for acute medical care but does need dialysis for which the brother will take him to [place] to check in through emergency room.  I spoke with Dr. Nino hospitalist at [facility] who will inform the ED physician to admit the patient to be restarted on peritoneal dialysis.  The patient's failure to thrive is most probably due to suboptimal dialysis for a long time.  He will need to be dialyzed on a regular basis to see some improvement in his medical  condition.  His issues are the following:    Hyperkalemia: Resolved now.  Due to end-stage renal disease.  The patient probably has not been able to dialyze himself optimally.   Continue to monitor for now.    End-stage renal disease: Spoke with Dr. Bar who is his nephrologist.  The patient has not been getting dialyzed optimally.  He would like him to be transferred to [facility].  They did not have any beds so patient is being discharged to go to [facility].  The patient is on peritoneal dialysis.  He manages it himself but is unable to do so anymore..  He has equipment with him.  He is a candidate for hemodialysis but refuses to get hemodialyzed.      Hypertension: Continue home medications.  He is on Coreg and amlodipine.    Chronic pain issues: The patient is on fentanyl patch  and tramadol for breakthrough pain.    Depression: The patient is sad but not suicidal.  Continue on Remeron.  He feels helpless overall.    Tobacco dependence: Continue with nicotine patch  Code Status: Full code    Discussed with the patient and the brother in detail.    Gregoria Emmanuel      Consultations This Hospital Stay   PHYSICAL THERAPY ADULT IP CONSULT  OCCUPATIONAL THERAPY ADULT IP CONSULT    Code Status   Full Code    Time Spent on this Encounter   IGregoria MD, personally saw the patient today and spent greater than 30 minutes discharging this patient.       Gregoria Emmanuel MD  [facility]  ______________________________________________________________________    Physical Exam   Vital Signs: Temp: 96.3  F (35.7  C) Temp src: Tympanic BP: 121/93   Heart Rate: 64 Resp: 16 SpO2: 96 % O2 Device: None (Room air)    Weight: 170 lbs 3.12 oz  General: The patient has expressive aphasia but is interactive, alert oriented hard to understand due to dysphasia  HEENT: PERRLA  Neck: Supple  Lungs: Bilaterally clear to auscultation  Cardiovascular:S1+S2 [place]  Abdomen: Soft, nontender, bowel sounds present,  dialysis catheter in place.  No erythema or discharge  Extremities: No pitting edema, pulses bilaterally palpable, no cyanosis  Neuro: Expressive dysphasia, right-sided hemiparesis  Psych: Depressed but not suicidal       Primary Care Physician   iLam Hallman    Discharge Orders      Reason for your hospital stay    ESRD failure to dialyze himself     Follow-up and recommended labs and tests     With [facility] today     Discharge Instructions    Follow up with [facility] ED     Activity    Your activity upon discharge: activity as tolerated     Full Code     Diet    Follow this diet upon discharge: Orders Placed This Encounter      Regular Diet Adult       Significant Results and Procedures   Most Recent 3 CBC's:  Recent Labs   Lab Test 01/30/20  0509 01/29/20  0531 01/27/20  1842   WBC 6.3 8.2 7.0   HGB 8.4* 8.9* 9.6*    100 99    180 201     Most Recent 3 BMP's:  Recent Labs   Lab Test 01/30/20  0509 01/29/20  0531 01/28/20  0507    135 137   POTASSIUM 4.9 4.8 5.5*   CHLORIDE 103 101 103   CO2 18* 22 22   BUN 90* 93* 95*   CR 10.10* 10.20* 10.60*   ANIONGAP 13 12 12   CHELSEA 8.5 8.5 8.6   * 90 115*   ,   Results for orders placed or performed during the hospital encounter of 01/27/20   XR Chest Port 1 View    Narrative    PROCEDURE:  XR CHEST PORT 1 VW    HISTORY: cough. .    COMPARISON:  None.    FINDINGS:    The cardiomediastinal contours are magnified by portable technique.  There is calcific aortic atherosclerosis.   Right hemidiaphragm is slightly elevated. There are mild basilar  opacities.      Impression    IMPRESSION:  Streaky bibasilar opacities favor atelectasis or  scarring. Consider follow-up.      CITLALY DUMONT MD       Discharge Medications   Current Discharge Medication List      START taking these medications    Details   acetaminophen (TYLENOL) 325 MG tablet Take 2 tablets (650 mg) by mouth every 4 hours as needed for mild pain  Qty: 90  tablet, Refills: 0    Associated Diagnoses: Failure to thrive (0-17)      DULoxetine (CYMBALTA) 20 MG capsule Take 1 capsule (20 mg) by mouth daily  Qty: 30 capsule, Refills: 0    Associated Diagnoses: Generalized muscle weakness      gabapentin (NEURONTIN) 100 MG capsule Take 3 capsules (300 mg) by mouth At Bedtime  Qty: 90 capsule, Refills: 0    Associated Diagnoses: Generalized muscle weakness         CONTINUE these medications which have CHANGED    Details   carvedilol (COREG) 12.5 MG tablet Take 1 tablet (12.5 mg) by mouth 2 times daily  Qty: 60 tablet, Refills: 0    Associated Diagnoses: Failure to thrive (0-17)         CONTINUE these medications which have NOT CHANGED    Details   amLODIPine (NORVASC) 10 MG tablet Take 10 mg by mouth At Bedtime       aspirin (ASA) 81 MG chewable tablet Take 81 mg by mouth daily      B Complex-C-Folic Acid (DIALYVITE PO) Take 1 tablet by mouth daily      calcitRIOL (ROCALTROL) 0.25 MCG capsule Take 0.25 mcg by mouth At Bedtime      calcium carbonate (TUMS) 500 MG chewable tablet Take 1 chew tab by mouth 2 times daily       cinacalcet (SENSIPAR) 30 MG tablet Take 30 mg by mouth daily      finasteride (PROSCAR) 5 MG tablet Take 5 mg by mouth daily      gentamicin (GARAMYCIN) 0.1 % external cream Apply 1 Application topically daily To abdominal shunt site      heparin 1000 UNIT/ML injection Inject 1 mL per liter of PD solution.      mirtazapine (REMERON) 15 MG tablet Take 15 mg by mouth At Bedtime      neomycin-bacitracin-polymyxin (NEOSPORIN) 5-400-5000 ointment Apply topically 3 times daily as needed Uses on scabbed itching areas      nicotine (NICODERM CQ) 21 MG/24HR 24 hr patch Place 1 patch onto the skin daily as needed for smoking cessation      senna-docusate (SENOKOT-S;PERICOLACE) 8.6-50 MG per tablet Take 2 tablets by mouth daily as needed for constipation      simvastatin (ZOCOR) 20 MG tablet Take 20 mg by mouth At Bedtime       tamsulosin (FLOMAX) 0.4 MG 24 hr capsule  Take 0.4 mg by mouth At Bedtime       traZODone (DESYREL) 50 MG tablet Take 50 mg by mouth nightly as needed            Allergies   No Known Allergies

## 2020-01-30 NOTE — PLAN OF CARE
Alert and oriented. Expressive aphagia and garbled speech at baseline. Right arm and leg deficit. HR irregular; rate 60-70s. -140s. Lungs clear. Sats adequate on room air. Infrequent dry, nonproductive cough noted. Audible bowel sounds. Urinal voiding and peritoneal dialysis done this shift. No IV access. Lidocaine patches, fentanyl patch and PRN tramadol given for pain. Very emotional this shift. Worried he is going to die. Had Dr. Darden come speak with patient and he is calmer after that conversation. Up assist of 1 with gait belt. Alarms on at all times. Had scab on chin he picked off - CARLEEN. Call light remains within reach and makes needs known. Does get frustrated easily.     Face to face report given with opportunity to observe patient.    Report given to ADAN Rodriguez RN   1/29/2020  11:04 PM

## 2020-01-30 NOTE — PLAN OF CARE
Here for weakness. Pt is A&O w/ VSS. Satting 93% on RA w/ no complaints of SOB or chest pain. Rates pain at a 2/10 to right shoulder and neck characteristic of a frequent aching. Cares clustered. No IV access. Dry, nonproductive cough noted. Dressings to lower back & neck remain CDI. Lidocaine patch is not in place. SANDIP/fentanyl patch remain in place. Was up in the chair during the night. AM Cr of 10.10. Bed in lowest position. Call light in reach. ID band in place. Makes needs known. Will continue to monitor.     Face to face report given with opportunity to observe patient.    Report given to Faye Lindo   1/30/2020  7:15 AM

## 2020-01-30 NOTE — PLAN OF CARE
"Spoke with patient's brother regarding possible transfer for peritoneal dialysis tomorrow (1/30). Patient's brother stated patient has done dialysis at [facility] and is familiar with the staff there and recommended looking into that option as well as patient would \"probably be more willing to transfer somewhere he knows\".     Spoke with the patient directly and he said he would be comfortable with a transfer to [facility] if [facility] is full. Preference is [facility].   "

## 2020-01-30 NOTE — PROGRESS NOTES
Name: Stuart Stewart    MRN#: [id number]    Reason for Hospitalization: Hyperkalemia [E87.5]  Generalized muscle weakness [M62.81]  Failure to thrive (0-17) [R62.51]    DWAYNE: low    Discharge Date: 1/30/2020    Medicare IM letter reviewed with him yesterday    Patient / Family response to discharge plan: they understand and agree    Follow-Up Appt:   Future Appointments   Date Time Provider Department Center   1/30/2020  2:00 PM Delvin Pizano, PT [facility]       Other Providers (Care Coordinator, County Services, PCA services etc): No    Discharge Disposition: [facility] via his brother Joe Nicholas CM RN

## 2020-01-30 NOTE — PLAN OF CARE
Occupational Therapy Discharge Summary    Reason for therapy discharge:    Discharged to home.    Progress towards therapy goal(s). See goals on Care Plan in [facility] electronic health record for goal details.  Goals partially met.  Barriers to achieving goals:   discharge from facility.    Therapy recommendation(s):    Pt is being discharged home to go to an ED elsewhere for dialysis. Recommend OT reevaluation after medical issues are addressed first.

## 2020-01-30 NOTE — PLAN OF CARE
Discharge Planner PT   Patient plan for discharge: SNF  Current status: Patient was sitting in chair upon PT arrival and was agreeable to participate. He was tearful and frustrated during the whole session. Performed sit to stand with Mod Ax2 for steadying. Attempted to use cane but after 10' he felt like he was going to fall. Trial of FWW instead and patient did a little better, but then wanted to sit as he became very frustrated and tearful. PT re-taped his shoulder to keep his humeral head better centrated in the GH joint - patient stated the tape made it feel better. Ambulated back to bed with min Ax2 then back to bed with min Ax1 for positioning  Barriers to return to prior living situation: Mobility deficits  Recommendations for discharge: SNF for rehab pending dialysis treatment  Rationale for recommendations: Due to current functional status and safety concerns       Entered by: Nadira Cornejo 01/29/2020 8:22 PM